# Patient Record
Sex: MALE | Race: BLACK OR AFRICAN AMERICAN | NOT HISPANIC OR LATINO | URBAN - METROPOLITAN AREA
[De-identification: names, ages, dates, MRNs, and addresses within clinical notes are randomized per-mention and may not be internally consistent; named-entity substitution may affect disease eponyms.]

---

## 2022-05-12 ENCOUNTER — INPATIENT (INPATIENT)
Facility: HOSPITAL | Age: 72
LOS: 2 days | Discharge: HOME | End: 2022-05-15
Attending: STUDENT IN AN ORGANIZED HEALTH CARE EDUCATION/TRAINING PROGRAM | Admitting: STUDENT IN AN ORGANIZED HEALTH CARE EDUCATION/TRAINING PROGRAM
Payer: MEDICAID

## 2022-05-12 VITALS
HEART RATE: 111 BPM | RESPIRATION RATE: 20 BRPM | TEMPERATURE: 99 F | OXYGEN SATURATION: 97 % | DIASTOLIC BLOOD PRESSURE: 101 MMHG | SYSTOLIC BLOOD PRESSURE: 117 MMHG

## 2022-05-12 DIAGNOSIS — J45.901 UNSPECIFIED ASTHMA WITH (ACUTE) EXACERBATION: ICD-10-CM

## 2022-05-12 LAB
ALBUMIN SERPL ELPH-MCNC: 4.3 G/DL — SIGNIFICANT CHANGE UP (ref 3.3–5)
ALP SERPL-CCNC: 106 U/L — SIGNIFICANT CHANGE UP (ref 40–120)
ALT FLD-CCNC: 19 U/L — SIGNIFICANT CHANGE UP (ref 4–41)
ANION GAP SERPL CALC-SCNC: 13 MMOL/L — SIGNIFICANT CHANGE UP (ref 7–14)
AST SERPL-CCNC: 29 U/L — SIGNIFICANT CHANGE UP (ref 4–40)
B PERT DNA SPEC QL NAA+PROBE: SIGNIFICANT CHANGE UP
B PERT+PARAPERT DNA PNL SPEC NAA+PROBE: SIGNIFICANT CHANGE UP
BASE EXCESS BLDV CALC-SCNC: 4.5 MMOL/L — HIGH (ref -2–3)
BASOPHILS # BLD AUTO: 0.05 K/UL — SIGNIFICANT CHANGE UP (ref 0–0.2)
BASOPHILS NFR BLD AUTO: 0.4 % — SIGNIFICANT CHANGE UP (ref 0–2)
BILIRUB SERPL-MCNC: 0.6 MG/DL — SIGNIFICANT CHANGE UP (ref 0.2–1.2)
BLOOD GAS VENOUS COMPREHENSIVE RESULT: SIGNIFICANT CHANGE UP
BORDETELLA PARAPERTUSSIS (RAPRVP): SIGNIFICANT CHANGE UP
BUN SERPL-MCNC: 9 MG/DL — SIGNIFICANT CHANGE UP (ref 7–23)
C PNEUM DNA SPEC QL NAA+PROBE: SIGNIFICANT CHANGE UP
CALCIUM SERPL-MCNC: 9.4 MG/DL — SIGNIFICANT CHANGE UP (ref 8.4–10.5)
CHLORIDE BLDV-SCNC: 102 MMOL/L — SIGNIFICANT CHANGE UP (ref 96–108)
CHLORIDE SERPL-SCNC: 99 MMOL/L — SIGNIFICANT CHANGE UP (ref 98–107)
CO2 BLDV-SCNC: 33.8 MMOL/L — HIGH (ref 22–26)
CO2 SERPL-SCNC: 26 MMOL/L — SIGNIFICANT CHANGE UP (ref 22–31)
CREAT SERPL-MCNC: 1.02 MG/DL — SIGNIFICANT CHANGE UP (ref 0.5–1.3)
EGFR: 78 ML/MIN/1.73M2 — SIGNIFICANT CHANGE UP
EOSINOPHIL # BLD AUTO: 0.26 K/UL — SIGNIFICANT CHANGE UP (ref 0–0.5)
EOSINOPHIL NFR BLD AUTO: 2.3 % — SIGNIFICANT CHANGE UP (ref 0–6)
FLUAV SUBTYP SPEC NAA+PROBE: SIGNIFICANT CHANGE UP
FLUBV RNA SPEC QL NAA+PROBE: SIGNIFICANT CHANGE UP
GAS PNL BLDV: 135 MMOL/L — LOW (ref 136–145)
GLUCOSE BLDV-MCNC: 121 MG/DL — HIGH (ref 70–99)
GLUCOSE SERPL-MCNC: 119 MG/DL — HIGH (ref 70–99)
HADV DNA SPEC QL NAA+PROBE: SIGNIFICANT CHANGE UP
HCO3 BLDV-SCNC: 32 MMOL/L — HIGH (ref 22–29)
HCOV 229E RNA SPEC QL NAA+PROBE: SIGNIFICANT CHANGE UP
HCOV HKU1 RNA SPEC QL NAA+PROBE: SIGNIFICANT CHANGE UP
HCOV NL63 RNA SPEC QL NAA+PROBE: SIGNIFICANT CHANGE UP
HCOV OC43 RNA SPEC QL NAA+PROBE: SIGNIFICANT CHANGE UP
HCT VFR BLD CALC: 43.2 % — SIGNIFICANT CHANGE UP (ref 39–50)
HCT VFR BLDA CALC: 45 % — SIGNIFICANT CHANGE UP (ref 39–51)
HGB BLD CALC-MCNC: 15.1 G/DL — SIGNIFICANT CHANGE UP (ref 13–17)
HGB BLD-MCNC: 15.7 G/DL — SIGNIFICANT CHANGE UP (ref 13–17)
HMPV RNA SPEC QL NAA+PROBE: SIGNIFICANT CHANGE UP
HPIV1 RNA SPEC QL NAA+PROBE: SIGNIFICANT CHANGE UP
HPIV2 RNA SPEC QL NAA+PROBE: SIGNIFICANT CHANGE UP
HPIV3 RNA SPEC QL NAA+PROBE: SIGNIFICANT CHANGE UP
HPIV4 RNA SPEC QL NAA+PROBE: SIGNIFICANT CHANGE UP
IANC: 8.06 K/UL — HIGH (ref 1.8–7.4)
IMM GRANULOCYTES NFR BLD AUTO: 0.3 % — SIGNIFICANT CHANGE UP (ref 0–1.5)
LACTATE BLDV-MCNC: 2.1 MMOL/L — HIGH (ref 0.5–2)
LYMPHOCYTES # BLD AUTO: 1.96 K/UL — SIGNIFICANT CHANGE UP (ref 1–3.3)
LYMPHOCYTES # BLD AUTO: 17.3 % — SIGNIFICANT CHANGE UP (ref 13–44)
M PNEUMO DNA SPEC QL NAA+PROBE: SIGNIFICANT CHANGE UP
MCHC RBC-ENTMCNC: 29.6 PG — SIGNIFICANT CHANGE UP (ref 27–34)
MCHC RBC-ENTMCNC: 36.3 GM/DL — HIGH (ref 32–36)
MCV RBC AUTO: 81.4 FL — SIGNIFICANT CHANGE UP (ref 80–100)
MONOCYTES # BLD AUTO: 0.97 K/UL — HIGH (ref 0–0.9)
MONOCYTES NFR BLD AUTO: 8.6 % — SIGNIFICANT CHANGE UP (ref 2–14)
NEUTROPHILS # BLD AUTO: 8.06 K/UL — HIGH (ref 1.8–7.4)
NEUTROPHILS NFR BLD AUTO: 71.1 % — SIGNIFICANT CHANGE UP (ref 43–77)
NRBC # BLD: 0 /100 WBCS — SIGNIFICANT CHANGE UP
NRBC # FLD: 0 K/UL — SIGNIFICANT CHANGE UP
NT-PROBNP SERPL-SCNC: 47 PG/ML — SIGNIFICANT CHANGE UP
PCO2 BLDV: 58 MMHG — HIGH (ref 42–55)
PH BLDV: 7.35 — SIGNIFICANT CHANGE UP (ref 7.32–7.43)
PLATELET # BLD AUTO: 250 K/UL — SIGNIFICANT CHANGE UP (ref 150–400)
PO2 BLDV: 36 MMHG — SIGNIFICANT CHANGE UP
POTASSIUM BLDV-SCNC: 4.3 MMOL/L — SIGNIFICANT CHANGE UP (ref 3.5–5.1)
POTASSIUM SERPL-MCNC: 5 MMOL/L — SIGNIFICANT CHANGE UP (ref 3.5–5.3)
POTASSIUM SERPL-SCNC: 5 MMOL/L — SIGNIFICANT CHANGE UP (ref 3.5–5.3)
PROT SERPL-MCNC: 8.9 G/DL — HIGH (ref 6–8.3)
RAPID RVP RESULT: SIGNIFICANT CHANGE UP
RBC # BLD: 5.31 M/UL — SIGNIFICANT CHANGE UP (ref 4.2–5.8)
RBC # FLD: 14.4 % — SIGNIFICANT CHANGE UP (ref 10.3–14.5)
RSV RNA SPEC QL NAA+PROBE: SIGNIFICANT CHANGE UP
RV+EV RNA SPEC QL NAA+PROBE: SIGNIFICANT CHANGE UP
SAO2 % BLDV: 61.4 % — SIGNIFICANT CHANGE UP
SARS-COV-2 RNA SPEC QL NAA+PROBE: SIGNIFICANT CHANGE UP
SODIUM SERPL-SCNC: 138 MMOL/L — SIGNIFICANT CHANGE UP (ref 135–145)
TROPONIN T, HIGH SENSITIVITY RESULT: 6 NG/L — SIGNIFICANT CHANGE UP
WBC # BLD: 11.33 K/UL — HIGH (ref 3.8–10.5)
WBC # FLD AUTO: 11.33 K/UL — HIGH (ref 3.8–10.5)

## 2022-05-12 PROCEDURE — 99285 EMERGENCY DEPT VISIT HI MDM: CPT

## 2022-05-12 PROCEDURE — 71045 X-RAY EXAM CHEST 1 VIEW: CPT | Mod: 26

## 2022-05-12 RX ORDER — LEVALBUTEROL 1.25 MG/.5ML
0.63 SOLUTION, CONCENTRATE RESPIRATORY (INHALATION) EVERY 6 HOURS
Refills: 0 | Status: DISCONTINUED | OUTPATIENT
Start: 2022-05-12 | End: 2022-05-13

## 2022-05-12 RX ORDER — IPRATROPIUM/ALBUTEROL SULFATE 18-103MCG
3 AEROSOL WITH ADAPTER (GRAM) INHALATION EVERY 4 HOURS
Refills: 0 | Status: DISCONTINUED | OUTPATIENT
Start: 2022-05-12 | End: 2022-05-12

## 2022-05-12 RX ORDER — IPRATROPIUM/ALBUTEROL SULFATE 18-103MCG
3 AEROSOL WITH ADAPTER (GRAM) INHALATION ONCE
Refills: 0 | Status: COMPLETED | OUTPATIENT
Start: 2022-05-12 | End: 2022-05-12

## 2022-05-12 RX ADMIN — Medication 40 MILLIGRAM(S): at 18:20

## 2022-05-12 RX ADMIN — Medication 3 MILLILITER(S): at 18:40

## 2022-05-12 RX ADMIN — Medication 3 MILLILITER(S): at 18:28

## 2022-05-12 RX ADMIN — Medication 3 MILLILITER(S): at 20:53

## 2022-05-12 RX ADMIN — Medication 40 MILLIGRAM(S): at 20:53

## 2022-05-12 RX ADMIN — Medication 3 MILLILITER(S): at 18:41

## 2022-05-12 NOTE — ED ADULT NURSE NOTE - OBJECTIVE STATEMENT
Pt received to rm  10 , awake and alert, A&OX4, ambulatory. C/o SOB with audible wheezing since Monday. States it has gotten worse over the last few days, but worst today, prompting ED visit. Reporting chest pain when coughing only.   Denies  N/V, HA, dizziness, palpitations, fatigue. 20G IV placed to RAC     . Bed in lowest position, call bell within reach. Will continue to monitor.

## 2022-05-12 NOTE — ED PROVIDER NOTE - CLINICAL SUMMARY MEDICAL DECISION MAKING FREE TEXT BOX
Pt with hx asthma here with cough wheeze sob, no fevers. CP only when coughing. No peripheral edema, no calf tenderness. Likely asthma related, less likely cardiac with exam and hx, no suspicion PE at this time will tx symptomatically with nebs, check trop/bnp, xr, rvp. Bipap if needs, likely admit.

## 2022-05-12 NOTE — ED PROVIDER NOTE - NS ED ROS FT
Constitutional: (-) fever (-) vomiting  Eyes/ENT: (-) vision changes, (-) hearing changes  Cardiovascular: (+) chest pain, (+) wheezing  Respiratory: +) cough, (+) shortness of breath  Gastrointestinal: (-) vomiting, (-) diarrhea, (-) abdominal pain  : (-) dysuria   Musculoskeletal: (-) back pain  Integumentary: (-) rash, (-) edema  Neurological: (-)loc  Allergic/Immunologic: (-) pruritus  Endocrine: No history of thyroid disease

## 2022-05-12 NOTE — ED PROVIDER NOTE - PHYSICAL EXAMINATION
Vitals: I have reviewed the patients vital signs  General: uncomfortable appearing  HEENT: Atraumatic, normocephalic, airway patent  Eyes: EOMI, tracking appropriately  Neck: no tracheal deviation, no JVD  Chest/Lungs: no trauma, symmetric chest rise, increased WOB with belly breathing, diffuse wheezing  Heart: skin and extremities well perfused, mildly tachy rate and rhythm, no peripheral edema  Neuro: A+Ox3, ambulating without difficulty, CN grossly intact  MSK: strength at baseline in all extremities, no muscle wasting or atrophy, no calf tenderness  Skin: no cyanosis, no jaundice, no new emergent lesions

## 2022-05-12 NOTE — ED PROVIDER NOTE - ATTENDING CONTRIBUTION TO CARE
Brief HPI:  70 yo M hx asthma (no intubations), htn presents with wheeze, cough, and shortness of breath for one week.  States cough is non-productive, non-bloody.  Denies cp, fever, chills, leg swelling.  Non-smoker.  No history of dvt/pe.      Vitals:   Reviewed    Exam:    GEN:  Non-toxic appearing, non-distressed, speaking full sentences, non-diaphoretic, AAOx3  HEENT:  NCAT, neck supple, EOMI, PERRLA, sclera anicteric, no conjunctival pallor or injection, no stridor, normal voice, no tonsillar exudate, uvula midline  CV:  tachy, regular, s1/s2 audible, no murmurs, rubs or gallops, peripheral pulses 2+ and symmetric  PULM:  tachypneic, wheeze in bilateral lung fields   ABD:  non distended, non-tender, no rebound, no guarding, negative Boss's sign, bowel sounds normal, no cvat  MSK:  no gross deformity, non-tender extremities and joints, range of motion grossly normal appearing, no extremity edema, extremities warm and well perfused   NEURO:  AAOx3, CN II-XII intact, motor 5/5 in upper and lower extremities bilaterally, sensation grossly intact in extremities and trunk  SKIN:  warm, dry, no rash or vesicles     A/P:  70 yo M hx asthma (no intubations), htn presents with wheeze, cough, and shortness of breath for one week.   VSS, no hypoxemia.  Diffuse wheeze on exam.  Patient appears euvolemic.  Suspect asthma exacerbation.  Low c/f chf exacerbation or pe.  Will send labs, cxr, ekg, supportive care.  Dispo pending.

## 2022-05-12 NOTE — ED PROVIDER NOTE - PROGRESS NOTE DETAILS
Aly: sx improving slowly pt recently started nebs will re eval. Curt, PGY3 - received pt as sign-out, asthma exacerbation, had just received duonebs prior to sign-out. Labs/CXR unremarkable, was pending re-eval. Pt reevaluated, still with diffuse wheezing BL, does not appear in respiratory distress, tachycardic to 120s (pt just received 3 duonebs, does not appear to have been spaced out based on work list), no CP/palpitations. Pt states wheeze improved but requesting admission for further management. Endorsed to hospitalist, who is requesting solumedrol 40 q8hr, duoneb q4h (ordered)

## 2022-05-12 NOTE — ED PROVIDER NOTE - OBJECTIVE STATEMENT
72 y/o M hx asthma, htn, no known coronary disease here now with wheezing since monday, worsening. Similar to previous asthma. Has cough, non productive. No fever/chills. No leg swelling. Has CP when coughing only. Did not have nebs/inhalers at home to try. 70 y/o M hx asthma, htn, no known coronary disease here now with wheezing since monday, worsening. Similar to previous asthma. Has cough, non productive. No fever/chills. No leg swelling. Has CP when coughing only. Did not have nebs/inhalers at home to try. Never smoker

## 2022-05-12 NOTE — ED ADULT TRIAGE NOTE - CHIEF COMPLAINT QUOTE
wheezing, SOB and coughing x 1 week. PT denies any N/V/D or fever. PT reports pain with coughing. PMH HTN, enlarged Prostate

## 2022-05-13 DIAGNOSIS — I10 ESSENTIAL (PRIMARY) HYPERTENSION: ICD-10-CM

## 2022-05-13 DIAGNOSIS — Z29.9 ENCOUNTER FOR PROPHYLACTIC MEASURES, UNSPECIFIED: ICD-10-CM

## 2022-05-13 DIAGNOSIS — R00.0 TACHYCARDIA, UNSPECIFIED: ICD-10-CM

## 2022-05-13 DIAGNOSIS — N40.0 BENIGN PROSTATIC HYPERPLASIA WITHOUT LOWER URINARY TRACT SYMPTOMS: ICD-10-CM

## 2022-05-13 DIAGNOSIS — Z98.890 OTHER SPECIFIED POSTPROCEDURAL STATES: Chronic | ICD-10-CM

## 2022-05-13 DIAGNOSIS — J45.901 UNSPECIFIED ASTHMA WITH (ACUTE) EXACERBATION: ICD-10-CM

## 2022-05-13 LAB
24R-OH-CALCIDIOL SERPL-MCNC: 25.6 NG/ML — LOW (ref 30–80)
A1C WITH ESTIMATED AVERAGE GLUCOSE RESULT: 5.5 % — SIGNIFICANT CHANGE UP (ref 4–5.6)
ANION GAP SERPL CALC-SCNC: 13 MMOL/L — SIGNIFICANT CHANGE UP (ref 7–14)
BUN SERPL-MCNC: 15 MG/DL — SIGNIFICANT CHANGE UP (ref 7–23)
CALCIUM SERPL-MCNC: 9.3 MG/DL — SIGNIFICANT CHANGE UP (ref 8.4–10.5)
CHLORIDE SERPL-SCNC: 100 MMOL/L — SIGNIFICANT CHANGE UP (ref 98–107)
CHOLEST SERPL-MCNC: 179 MG/DL — SIGNIFICANT CHANGE UP
CK MB BLD-MCNC: 3.5 % — HIGH (ref 0–2.5)
CK MB CFR SERPL CALC: 5.5 NG/ML — SIGNIFICANT CHANGE UP
CK SERPL-CCNC: 155 U/L — SIGNIFICANT CHANGE UP (ref 30–200)
CO2 SERPL-SCNC: 23 MMOL/L — SIGNIFICANT CHANGE UP (ref 22–31)
CREAT SERPL-MCNC: 1.14 MG/DL — SIGNIFICANT CHANGE UP (ref 0.5–1.3)
D DIMER BLD IA.RAPID-MCNC: 198 NG/ML DDU — SIGNIFICANT CHANGE UP
EGFR: 68 ML/MIN/1.73M2 — SIGNIFICANT CHANGE UP
ESTIMATED AVERAGE GLUCOSE: 111 — SIGNIFICANT CHANGE UP
GLUCOSE BLDC GLUCOMTR-MCNC: 143 MG/DL — HIGH (ref 70–99)
GLUCOSE BLDC GLUCOMTR-MCNC: 144 MG/DL — HIGH (ref 70–99)
GLUCOSE BLDC GLUCOMTR-MCNC: 149 MG/DL — HIGH (ref 70–99)
GLUCOSE BLDC GLUCOMTR-MCNC: 224 MG/DL — HIGH (ref 70–99)
GLUCOSE SERPL-MCNC: 189 MG/DL — HIGH (ref 70–99)
HCT VFR BLD CALC: 40.6 % — SIGNIFICANT CHANGE UP (ref 39–50)
HDLC SERPL-MCNC: 52 MG/DL — SIGNIFICANT CHANGE UP
HGB BLD-MCNC: 14.7 G/DL — SIGNIFICANT CHANGE UP (ref 13–17)
LACTATE SERPL-SCNC: 3 MMOL/L — HIGH (ref 0.5–2)
LIPID PNL WITH DIRECT LDL SERPL: 117 MG/DL — HIGH
MAGNESIUM SERPL-MCNC: 2 MG/DL — SIGNIFICANT CHANGE UP (ref 1.6–2.6)
MCHC RBC-ENTMCNC: 29.8 PG — SIGNIFICANT CHANGE UP (ref 27–34)
MCHC RBC-ENTMCNC: 36.2 GM/DL — HIGH (ref 32–36)
MCV RBC AUTO: 82.4 FL — SIGNIFICANT CHANGE UP (ref 80–100)
NON HDL CHOLESTEROL: 127 MG/DL — SIGNIFICANT CHANGE UP
NRBC # BLD: 0 /100 WBCS — SIGNIFICANT CHANGE UP
NRBC # FLD: 0 K/UL — SIGNIFICANT CHANGE UP
PHOSPHATE SERPL-MCNC: 1.6 MG/DL — LOW (ref 2.5–4.5)
PLATELET # BLD AUTO: 235 K/UL — SIGNIFICANT CHANGE UP (ref 150–400)
POTASSIUM SERPL-MCNC: 4 MMOL/L — SIGNIFICANT CHANGE UP (ref 3.5–5.3)
POTASSIUM SERPL-SCNC: 4 MMOL/L — SIGNIFICANT CHANGE UP (ref 3.5–5.3)
RBC # BLD: 4.93 M/UL — SIGNIFICANT CHANGE UP (ref 4.2–5.8)
RBC # FLD: 14 % — SIGNIFICANT CHANGE UP (ref 10.3–14.5)
SODIUM SERPL-SCNC: 136 MMOL/L — SIGNIFICANT CHANGE UP (ref 135–145)
TRIGL SERPL-MCNC: 49 MG/DL — SIGNIFICANT CHANGE UP
TROPONIN T, HIGH SENSITIVITY RESULT: 9 NG/L — SIGNIFICANT CHANGE UP
TSH SERPL-MCNC: 0.32 UIU/ML — SIGNIFICANT CHANGE UP (ref 0.27–4.2)
WBC # BLD: 8.78 K/UL — SIGNIFICANT CHANGE UP (ref 3.8–10.5)
WBC # FLD AUTO: 8.78 K/UL — SIGNIFICANT CHANGE UP (ref 3.8–10.5)

## 2022-05-13 PROCEDURE — 99223 1ST HOSP IP/OBS HIGH 75: CPT

## 2022-05-13 PROCEDURE — 12345: CPT | Mod: NC,GC

## 2022-05-13 RX ORDER — ALBUTEROL 90 UG/1
2 AEROSOL, METERED ORAL EVERY 6 HOURS
Refills: 0 | Status: DISCONTINUED | OUTPATIENT
Start: 2022-05-13 | End: 2022-05-15

## 2022-05-13 RX ORDER — GLUCAGON INJECTION, SOLUTION 0.5 MG/.1ML
1 INJECTION, SOLUTION SUBCUTANEOUS ONCE
Refills: 0 | Status: DISCONTINUED | OUTPATIENT
Start: 2022-05-13 | End: 2022-05-13

## 2022-05-13 RX ORDER — DEXTROSE 50 % IN WATER 50 %
15 SYRINGE (ML) INTRAVENOUS ONCE
Refills: 0 | Status: DISCONTINUED | OUTPATIENT
Start: 2022-05-13 | End: 2022-05-13

## 2022-05-13 RX ORDER — ACETAMINOPHEN 500 MG
650 TABLET ORAL EVERY 6 HOURS
Refills: 0 | Status: DISCONTINUED | OUTPATIENT
Start: 2022-05-13 | End: 2022-05-15

## 2022-05-13 RX ORDER — SODIUM CHLORIDE 9 MG/ML
1000 INJECTION, SOLUTION INTRAVENOUS
Refills: 0 | Status: DISCONTINUED | OUTPATIENT
Start: 2022-05-13 | End: 2022-05-13

## 2022-05-13 RX ORDER — LANOLIN ALCOHOL/MO/W.PET/CERES
3 CREAM (GRAM) TOPICAL AT BEDTIME
Refills: 0 | Status: DISCONTINUED | OUTPATIENT
Start: 2022-05-13 | End: 2022-05-15

## 2022-05-13 RX ORDER — INSULIN LISPRO 100/ML
VIAL (ML) SUBCUTANEOUS
Refills: 0 | Status: DISCONTINUED | OUTPATIENT
Start: 2022-05-13 | End: 2022-05-13

## 2022-05-13 RX ORDER — DEXTROSE 50 % IN WATER 50 %
25 SYRINGE (ML) INTRAVENOUS ONCE
Refills: 0 | Status: DISCONTINUED | OUTPATIENT
Start: 2022-05-13 | End: 2022-05-13

## 2022-05-13 RX ORDER — HEPARIN SODIUM 5000 [USP'U]/ML
5000 INJECTION INTRAVENOUS; SUBCUTANEOUS EVERY 8 HOURS
Refills: 0 | Status: DISCONTINUED | OUTPATIENT
Start: 2022-05-13 | End: 2022-05-13

## 2022-05-13 RX ORDER — INSULIN LISPRO 100/ML
VIAL (ML) SUBCUTANEOUS AT BEDTIME
Refills: 0 | Status: DISCONTINUED | OUTPATIENT
Start: 2022-05-13 | End: 2022-05-13

## 2022-05-13 RX ORDER — TAMSULOSIN HYDROCHLORIDE 0.4 MG/1
0.4 CAPSULE ORAL AT BEDTIME
Refills: 0 | Status: DISCONTINUED | OUTPATIENT
Start: 2022-05-13 | End: 2022-05-15

## 2022-05-13 RX ORDER — SODIUM,POTASSIUM PHOSPHATES 278-250MG
1 POWDER IN PACKET (EA) ORAL
Refills: 0 | Status: COMPLETED | OUTPATIENT
Start: 2022-05-13 | End: 2022-05-13

## 2022-05-13 RX ORDER — ENOXAPARIN SODIUM 100 MG/ML
40 INJECTION SUBCUTANEOUS EVERY 24 HOURS
Refills: 0 | Status: DISCONTINUED | OUTPATIENT
Start: 2022-05-13 | End: 2022-05-15

## 2022-05-13 RX ORDER — LEVALBUTEROL 1.25 MG/.5ML
0.63 SOLUTION, CONCENTRATE RESPIRATORY (INHALATION) EVERY 6 HOURS
Refills: 0 | Status: DISCONTINUED | OUTPATIENT
Start: 2022-05-13 | End: 2022-05-15

## 2022-05-13 RX ORDER — DEXTROSE 50 % IN WATER 50 %
12.5 SYRINGE (ML) INTRAVENOUS ONCE
Refills: 0 | Status: DISCONTINUED | OUTPATIENT
Start: 2022-05-13 | End: 2022-05-13

## 2022-05-13 RX ORDER — ONDANSETRON 8 MG/1
4 TABLET, FILM COATED ORAL EVERY 8 HOURS
Refills: 0 | Status: DISCONTINUED | OUTPATIENT
Start: 2022-05-13 | End: 2022-05-15

## 2022-05-13 RX ORDER — LABETALOL HCL 100 MG
100 TABLET ORAL
Refills: 0 | Status: DISCONTINUED | OUTPATIENT
Start: 2022-05-13 | End: 2022-05-15

## 2022-05-13 RX ORDER — BUDESONIDE AND FORMOTEROL FUMARATE DIHYDRATE 160; 4.5 UG/1; UG/1
2 AEROSOL RESPIRATORY (INHALATION)
Refills: 0 | Status: DISCONTINUED | OUTPATIENT
Start: 2022-05-13 | End: 2022-05-15

## 2022-05-13 RX ADMIN — Medication 40 MILLIGRAM(S): at 06:02

## 2022-05-13 RX ADMIN — BUDESONIDE AND FORMOTEROL FUMARATE DIHYDRATE 2 PUFF(S): 160; 4.5 AEROSOL RESPIRATORY (INHALATION) at 21:59

## 2022-05-13 RX ADMIN — ENOXAPARIN SODIUM 40 MILLIGRAM(S): 100 INJECTION SUBCUTANEOUS at 21:59

## 2022-05-13 RX ADMIN — LEVALBUTEROL 0.63 MILLIGRAM(S): 1.25 SOLUTION, CONCENTRATE RESPIRATORY (INHALATION) at 16:37

## 2022-05-13 RX ADMIN — Medication 100 MILLIGRAM(S): at 17:40

## 2022-05-13 RX ADMIN — Medication 100 MILLIGRAM(S): at 06:02

## 2022-05-13 RX ADMIN — Medication 1 PACKET(S): at 17:41

## 2022-05-13 RX ADMIN — Medication 40 MILLIGRAM(S): at 12:04

## 2022-05-13 RX ADMIN — LEVALBUTEROL 0.63 MILLIGRAM(S): 1.25 SOLUTION, CONCENTRATE RESPIRATORY (INHALATION) at 12:17

## 2022-05-13 RX ADMIN — LEVALBUTEROL 0.63 MILLIGRAM(S): 1.25 SOLUTION, CONCENTRATE RESPIRATORY (INHALATION) at 23:33

## 2022-05-13 RX ADMIN — Medication 1 PACKET(S): at 08:57

## 2022-05-13 RX ADMIN — HEPARIN SODIUM 5000 UNIT(S): 5000 INJECTION INTRAVENOUS; SUBCUTANEOUS at 12:03

## 2022-05-13 RX ADMIN — Medication 200 MILLIGRAM(S): at 17:38

## 2022-05-13 RX ADMIN — Medication 1 PACKET(S): at 12:04

## 2022-05-13 RX ADMIN — Medication 100 MILLIGRAM(S): at 17:41

## 2022-05-13 RX ADMIN — TAMSULOSIN HYDROCHLORIDE 0.4 MILLIGRAM(S): 0.4 CAPSULE ORAL at 21:59

## 2022-05-13 NOTE — H&P ADULT - ASSESSMENT
71M history of asthma, with one prior hospitalization many years ago, not on any medications, admitted for asthma exacerbation.

## 2022-05-13 NOTE — H&P ADULT - PROBLEM SELECTOR PLAN 4
- Not on medications, BPs here noted to be elevated  - Given his sinus tach and elevated BPs will place patient on BB with labetalol 100mg BID, monitor response to medication  - Will likely need PCP appointment on discharge for follow up of his BP and other medical issues  - Check A1C, TSH, and lipid profile in AM

## 2022-05-13 NOTE — PROGRESS NOTE ADULT - PROBLEM SELECTOR PLAN 5
DVT ppx - Low risk, will place on SCDs  Diet - DASH/CC diet  Activity - Ambulate with assistance  Fall, Aspiration, safety and seizure precautions.  Errol calvin -DVT ppx - HSQ  -Diet - DASH/CC diet  -Activity - Ambulate with assistance  -Fall, Aspiration, safety and seizure precautions.  -JOE calvin

## 2022-05-13 NOTE — H&P ADULT - NSHPSOCIALHISTORY_GEN_ALL_CORE
.  Lives with spouse.  Denies Nicotine.  ETOH < 1 x a month.  Illicit/ recreational drugs: Denies.  Colonoscopy 2018: Normal.   Flu Vax: Denies  PNA Vax: Denies  COVID Vax X 2  Retired hike.

## 2022-05-13 NOTE — MEDICAL STUDENT PROGRESS NOTE(EDUCATION) - NS MD HP STUD ASPLAN PLAN FT
Problem/Plan - 1: Acute asthma exacerbation in the setting of allergen exposure and poor medical compliance.    [] Continue Nebulizer Q4  [] Transition to Prednisone 40 mg PO   [] ERROL eval for medication accessibility.  [] Lactate mildly elevated to 3.0, will repeat in AM.    Problem/Plan - 2: Sinus tachycardia most likely secondary to albuterol vs. stress response. Low concern for PE  [] D Dimer = 198.  [] EKG sinus tach   [] A1C, Lipid panel wnl  [] Monitor HR with treatment of his asthma, if not improving then consider tele    Problem/Plan - 3: BPH denies any issues urinating currently, including dysuria increased frequency   [] consider restarting tamsulosin if patient retaining.     Problem/Plan - 4: Essential hypertension in the setting of poor medical compliance.  [] BP currently well controlled    [] labetalol 100mg BID    Problem/Plan - 5: Preventive measure.   [] Lovenox   [] Diet - DASH/CC diet  Activity - Ambulate with assistance  Fall, Aspiration, safety and seizure precautions.  Errol eval.

## 2022-05-13 NOTE — PROGRESS NOTE ADULT - ASSESSMENT
71M history of asthma, with one prior hospitalization many years ago, not on any medications, admitted for asthma exacerbation.  71M history of asthma, with one prior hospitalization many years ago, not on any medications, admitted for asthma exacerbation, with symptomatic improvement after neb treatment, s/p IV solumedrol transitioned to PO prednisone 50mg. Pending PT consult for dispo planning.

## 2022-05-13 NOTE — H&P ADULT - NEUROLOGICAL DETAILS
responds to verbal commands/sensation intact alert and oriented x 3/responds to verbal commands/sensation intact/normal strength

## 2022-05-13 NOTE — H&P ADULT - NS ATTEND AMEND GEN_ALL_CORE FT
Patient seen and examined on 5/13/22, case discussed with MERT Robison. This is a 71M with history as above who presents to the hospital with asthma exacerbation. Symptoms since Monday of this week (5 days PTA), worsening over time, no associated cough/sputum, no URI, no fevers/chills, no chest pain/palpitations/LOC, no other acute complaints. Here noted to have wheezing and persistent tachycardia, s/p solumedrol and duonebs in ED with improvement in his wheezing. Here on examination still with some scattered wheezing and persistent tachycardia, otherwise benign. Lab work, imaging, and EKG reviewed. Currently with asthma exacerbation, will c/w steroids, duonebs. Still with persistent tachycardia but denies any symptoms, EKG with sinus tach, ddimer and trops low and negative, unclear if he has tachycardia at baseline or if this is 2/2 asthma exacerbation, will continue to monitor. BPs elevated, will start on labetalol. Other management as above.

## 2022-05-13 NOTE — H&P ADULT - NSHPPHYSICALEXAM_GEN_ALL_CORE
Vital Signs Last 24 Hrs  T(C): 36.9 (12 May 2022 20:45), Max: 37.3 (12 May 2022 15:26)  T(F): 98.4 (12 May 2022 20:45), Max: 99.1 (12 May 2022 15:26)  HR: 114 (12 May 2022 20:45) (101 - 124)  BP: 156/88 (12 May 2022 20:45) (117/101 - 199/109)  BP(mean): --  RR: 17 (12 May 2022 20:45) (17 - 30)  SpO2: 100% (12 May 2022 20:45) (95% - 100%)

## 2022-05-13 NOTE — H&P ADULT - NSICDXPASTMEDICALHX_GEN_ALL_CORE_FT
PAST MEDICAL HISTORY:  Asthma      PAST MEDICAL HISTORY:  Asthma     BPH (benign prostatic hyperplasia)     Essential hypertension

## 2022-05-13 NOTE — H&P ADULT - RS GEN PE MLT RESP DETAILS PC
airway patent/breath sounds equal/respirations non-labored/no chest wall tenderness/no intercostal retractions/wheezes

## 2022-05-13 NOTE — H&P ADULT - NSVTERISKASSESS_GEN_ALL_CORE FT
VTE Present on Admission, Assessment Completed on: 13-May-2022 02:01 Medical Assessment Completed on: 13-May-2022 05:22

## 2022-05-13 NOTE — H&P ADULT - NEGATIVE MUSCULOSKELETAL SYMPTOMS
no muscle weakness/no arm pain L/no arm pain R/no leg pain L/no leg pain R no arthralgia/no arthritis/no myalgia/no muscle weakness/no arm pain L/no arm pain R/no leg pain L/no leg pain R

## 2022-05-13 NOTE — H&P ADULT - PROBLEM SELECTOR PLAN 3
VTE with Lovenox 40 mg sub cut daily.  Fall, Aspiration, safety and seizure precautions.  Errol calvin - Not on tamsulosin, denies any issues urinating currently  - Will place on bladder scans q8h for PVR check, consider restarting if patient with retention

## 2022-05-13 NOTE — H&P ADULT - MUSCULOSKELETAL
normal strength/no joint swelling/no joint erythema/no joint warmth/no calf tenderness detailed exam details…

## 2022-05-13 NOTE — H&P ADULT - PROBLEM SELECTOR PLAN 2
- D Dimer = 198.  Low suspicion for P.E.   - Albuterol nebs Q4* changed to Xopenex Neb Q4* to decrease S.E. of tachycardia.  - F/U TSH, A1C, Lipid panel. - D Dimer = 198.  - Low suspicion for P.E.   - Albuterol nebs Q4* changed to Xopenex Neb Q4* to decrease S.E. of tachycardia.  - F/U TSH, A1C, Lipid panel.  - Monitor HR with treatment of his asthma, if not improving then consider further evaluation (?TTE)

## 2022-05-13 NOTE — DISCHARGE NOTE PROVIDER - NSDCMRMEDTOKEN_GEN_ALL_CORE_FT
acetaminophen 325 mg oral tablet: 2 tab(s) orally every 6 hours, As needed, Temp greater or equal to 38C (100.4F), Mild Pain (1 - 3)  albuterol 90 mcg/inh inhalation aerosol: 2 puff(s) inhaled every 6 hours, As needed, Shortness of Breath and/or Wheezing  budesonide-formoterol 80 mcg-4.5 mcg/inh inhalation aerosol: 2 puff(s) inhaled 2 times a day   labetalol 200 mg oral tablet: 1 tab(s) orally every 12 hours   predniSONE 20 mg oral tablet: 2 tab(s) orally once a day   tamsulosin 0.4 mg oral capsule: 1 cap(s) orally once a day (at bedtime)

## 2022-05-13 NOTE — MEDICAL STUDENT PROGRESS NOTE(EDUCATION) - SUBJECTIVE AND OBJECTIVE BOX
Patient is a 71y old  Male who presents with a chief complaint of Wheezing x 5 days (13 May 2022 07:29)      INTERVAL HPI/OVERNIGHT EVENTS:    No acute events overnight. Patient endorses improvement in breathing. No chest pain, fever, cough, or any other acute complaints at this time.     T(C): 37.4 (05-13-22 @ 06:00), Max: 37.4 (05-13-22 @ 06:00)  HR: 113 (05-13-22 @ 06:00) (101 - 124)  BP: 132/70 (05-13-22 @ 06:00) (117/101 - 199/109)  RR: 16 (05-13-22 @ 06:00) (16 - 30)  SpO2: 96% (05-13-22 @ 06:00) (95% - 100%)  Wt(kg): --  I&O's Summary    12 May 2022 07:01  -  13 May 2022 07:00  --------------------------------------------------------  IN: 0 mL / OUT: 100 mL / NET: -100 mL        LABS:                        14.7   8.78  )-----------( 235      ( 13 May 2022 06:15 )             40.6     05-13    136  |  100  |  15  ----------------------------<  189<H>  4.0   |  23  |  1.14    Ca    9.3      13 May 2022 06:15  Phos  1.6     05-13  Mg     2.00     05-13    TPro  8.9<H>  /  Alb  4.3  /  TBili  0.6  /  DBili  x   /  AST  29  /  ALT  19  /  AlkPhos  106  05-12        CAPILLARY BLOOD GLUCOSE      POCT Blood Glucose.: 143 mg/dL (13 May 2022 08:49)  POCT Blood Glucose.: 224 mg/dL (13 May 2022 02:26)            MEDICATIONS  (STANDING):  heparin   Injectable 5000 Unit(s) SubCutaneous every 8 hours  labetalol 100 milliGRAM(s) Oral two times a day  levalbuterol Inhalation 0.63 milliGRAM(s) Inhalation every 6 hours  potassium phosphate / sodium phosphate Powder (PHOS-NaK) 1 Packet(s) Oral three times a day with meals  predniSONE   Tablet 40 milliGRAM(s) Oral daily    MEDICATIONS  (PRN):  acetaminophen     Tablet .. 650 milliGRAM(s) Oral every 6 hours PRN Temp greater or equal to 38C (100.4F), Mild Pain (1 - 3)  benzonatate 100 milliGRAM(s) Oral every 8 hours PRN Cough  guaiFENesin Oral Liquid (Sugar-Free) 200 milliGRAM(s) Oral every 6 hours PRN Cough  melatonin 3 milliGRAM(s) Oral at bedtime PRN Insomnia  ondansetron Injectable 4 milliGRAM(s) IV Push every 8 hours PRN Nausea and/or Vomiting      REVIEW OF SYSTEMS:  CONSTITUTIONAL: No fever, weight loss, or fatigue  EYES: No eye pain, visual disturbances, or discharge  ENMT:  No difficulty hearing, tinnitus, vertigo; No sinus or throat pain  NECK: No pain or stiffness  RESPIRATORY: No cough, wheezing, chills or hemoptysis; (+) shortness of breath  CARDIOVASCULAR: No chest pain, palpitations, dizziness, or leg swelling  GASTROINTESTINAL: No abdominal or epigastric pain. No nausea, vomiting, or hematemesis; No diarrhea or constipation. No melena or hematochezia.  GENITOURINARY: No dysuria, frequency, hematuria, or incontinence  NEUROLOGICAL: No headaches, memory loss, loss of strength, numbness, or tremors  SKIN: No itching, burning, rashes, or lesions   LYMPH NODES: No enlarged glands  ENDOCRINE: No heat or cold intolerance; No hair loss  MUSCULOSKELETAL: No joint pain or swelling; No muscle, back, or extremity pain  PSYCHIATRIC: No depression, anxiety, mood swings, or difficulty sleeping  HEME/LYMPH: No easy bruising, or bleeding gums  ALLERY AND IMMUNOLOGIC: No hives or eczema    RADIOLOGY & ADDITIONAL TESTS:    Imaging Personally Reviewed:  [x] YES  [ ] NO    Consultant(s) Notes Reviewed:  [x] YES  [ ] NO    PHYSICAL EXAM:  GENERAL: NAD, well-groomed, well-developed, speaking in full sentences   HEAD:  Atraumatic, Normocephalic  EYES: EOMI, conjunctiva and sclera clear  ENMT: Moist mucous membranes, Good dentition, No lesions  NECK: Supple, No JVD, Normal thyroid  NERVOUS SYSTEM:  Alert & Oriented X3, Good concentration; Motor Strength 5/5 B/L upper and lower extremities  CHEST/LUNG: faint R-sided expiratory wheeze with slightly diminished breath sounds at the base; No rales, rhonchi, or rubs  HEART: Regular rate and rhythm; No murmurs, rubs, or gallops  ABDOMEN: Soft, Nontender, Nondistended; Bowel sounds present  EXTREMITIES: No clubbing, cyanosis, or edema  LYMPH: No lymphadenopathy noted  SKIN: No rashes or lesions      < from: Xray Chest 1 View-PORTABLE IMMEDIATE (05.12.22 @ 18:34) >    ACC: [id number] EXAM:  XR CHEST PORTABLE IMMED 1V                          PROCEDURE DATE:  05/12/2022          INTERPRETATION:  INDICATION: Chest pain    COMPARISON: None    FINDINGS:  The heart is normal in size.  The lungs are clear.  No pleural effusion or pneumothorax.    Impression:  Clear lungs    --- End of Report ---          DAYANARA JUARES MD; Resident Radiology  This document has been electronically signed.  NAVEEN MONTEMAYOR MD; Attending Radiologist  This document has been electronically signed. May 13 2022  7:56AM    < end of copied text >

## 2022-05-13 NOTE — DISCHARGE NOTE PROVIDER - CARE PROVIDER_API CALL
NATACHA RUSS  Internal Medicine  [address]  [facility] [place] [address]  Phone: [phone]  Fax: [phone]  Follow Up Time:

## 2022-05-13 NOTE — DISCHARGE NOTE PROVIDER - HOSPITAL COURSE
71M history of asthma, with one prior hospitalization many years ago, not on any medications, admitted for asthma exacerbation. Pt s/p IV solumedrol and nebs with improvement. Showed improvement and transitioned to PO prednisone. Switched to Xopenex given tachycardia. Tachycardia improving, sinus on EKG. Course c/b HTN urgency, improved on labetalol. Started on low ICS+ formoterol inhaler with a RENEE prn given pt will be unable to f/u outpt for at least a month due to insurance issues. 71M history of asthma, with one prior hospitalization many years ago, not on any medications, admitted for asthma exacerbation. Pt s/p IV solumedrol and nebs with improvement. Showed improvement and transitioned to PO prednisone. Switched to Xopenex given tachycardia. Tachycardia improving, sinus on EKG. Course c/b HTN urgency, improved on labetalol. Started on low ICS+ formoterol inhaler with a RENEE prn given pt will be unable to f/u outpt for at least a month due to insurance issues.     At this time, patient is optimized for discharge with outpatient follow-up.

## 2022-05-13 NOTE — H&P ADULT - PROBLEM SELECTOR PLAN 1
- Continue Nebulizer Q4* and Solumedrol 40 mg IVC Q8*.  - Transition to Prednisone 40 mg po when able.  - Pt started on FS QID and low dose corrective ISS while on steroid therapy.   - Check peak flow daily.  - Robitussin and Tessalon for chest congestion.   - SW eval for medication accessibility. - Continue Nebulizer Q4* and Solumedrol 40 mg IVC Q8*.  - Transition to Prednisone 40 mg po when able.  - Pt started on FS QID and low dose corrective ISS while on steroid therapy.   - Check peak flow daily.  - Robitussin and Tessalon for chest congestion.   - SW eval for medication accessibility.  - Lactate mildly elevated to 2.1, will repeat in AM

## 2022-05-13 NOTE — PROGRESS NOTE ADULT - PROBLEM SELECTOR PLAN 4
- Not on medications, BPs here noted to be elevated  - Given his sinus tach and elevated BPs will place patient on BB with labetalol 100mg BID, monitor response to medication  - Will likely need PCP appointment on discharge for follow up of his BP and other medical issues  - Check A1C, TSH, and lipid profile in AM - Not on medications, BPs here noted to be elevated  - Given his sinus tach and elevated BPs will place patient on BB with labetalol 100mg BID, monitor response to medication  - Will likely need PCP appointment on discharge for follow up of his BP and other medical issues  - A1C: 5.5  - Lipid panel sent

## 2022-05-13 NOTE — PROGRESS NOTE ADULT - PROBLEM SELECTOR PLAN 2
- D Dimer = 198.  - Low suspicion for P.E.   - Albuterol nebs Q4* changed to Xopenex Neb Q4* to decrease S.E. of tachycardia.  - F/U TSH, A1C, Lipid panel.  - Monitor HR with treatment of his asthma, if not improving then consider further evaluation (?TTE) - D Dimer = 198.  - Low suspicion for P.E.   - Albuterol nebs Q4* changed to Xopenex Neb Q4* to decrease S.E. of tachycardia.  - TSH wnl  - Monitor HR with treatment of his asthma, if not improving then consider further evaluation (?Tele or TTE)

## 2022-05-13 NOTE — PATIENT PROFILE ADULT - SW.
Patient Education        Recurring Migraine Headache: Care Instructions  Your Care Instructions  Migraines are painful, throbbing headaches. They often start on one side of the head. They may cause nausea and vomiting and make you sensitive to light, sound, or smell. Some people may have only a few migraines throughout life. Others have them as often as several times a month. The goal of treatment is to reduce the number of migraines you have and relieve your symptoms. Even with treatment, you may continue to have migraines. You play an important role in dealing with your headaches. Work on avoiding things that seem to trigger your migraines. When you feel a headache coming on, act quickly to stop it before it gets worse. Follow-up care is a key part of your treatment and safety. Be sure to make and go to all appointments, and call your doctor if you are having problems. It's also a good idea to know your test results and keep a list of the medicines you take. How can you care for yourself at home? · Do not drive if you have taken a prescription pain medicine. · Rest in a quiet, dark room until your headache is gone. Close your eyes and try to relax or go to sleep. Do not watch TV or read. · Put a cold, moist cloth or cold pack on the painful area for 10 to 20 minutes at a time. Put a thin cloth between the cold pack and your skin. · Have someone gently massage your neck and shoulders. · Take your medicines exactly as prescribed. Call your doctor if you think you are having a problem with your medicine. You will get more details on the specific medicines your doctor prescribes. · Don't take medicine for headache pain too often. Talk to your doctor if you are taking medicine more than 2 days a week to stop a headache. Taking too much pain medicine can lead to more headaches. These are called medicine-overuse headaches.   To prevent migraines  · Keep a headache diary so you can figure out what triggers your headaches. Avoiding triggers may help you prevent headaches. Record when each headache began, how long it lasted, and what the pain was like. Write down any other symptoms you had with the headache. These may include nausea, flashing lights or dark spots, or sensitivity to bright light or loud noise. Note if the headache occurred near your period. List anything that might have triggered the headache. Triggers may include certain foods (chocolate, cheese, wine) or odors, smoke, bright light, stress, or lack of sleep. · If your doctor has prescribed medicine for your migraines, take it as directed. You may have medicine that you take only when you get a migraine and medicine that you take all the time to help prevent migraines. ? If your doctor has prescribed medicine for when you get a headache, take it at the first sign of a migraine, unless your doctor has given you other instructions. ? If your doctor has prescribed medicine to prevent migraines, take it exactly as prescribed. Call your doctor if you think you are having a problem with your medicine. · Find healthy ways to deal with stress. Migraines are most common during or right after stressful times. Try finding ways to reduce stress like practicing mindfulness or deep breathing exercises. · Get regular sleep and exercise. But be careful to not push yourself too hard during exercise. It may trigger a headache. · Eat regular meals, and avoid foods and drinks that often trigger migraines. These include chocolate and alcohol, especially red wine and port. Chemicals used in food, such as aspartame and monosodium glutamate (MSG), also can trigger migraines. So can some food additives, such as those found in hot dogs, barragan, cold cuts, aged cheeses, and pickled foods. · Limit caffeine by not drinking too much coffee, tea, or soda. Do not quit caffeine suddenly, because that can also give you migraines. · Do not smoke or allow others to smoke around you.  If you need help quitting, talk to your doctor about stop-smoking programs and medicines. These can increase your chances of quitting for good. · If you are taking birth control pills or hormone therapy, talk to your doctor about whether they are triggering your migraines. When should you call for help? Call 911 anytime you think you may need emergency care. For example, call if:    · You have symptoms of a stroke. These may include:  ? Sudden numbness, tingling, weakness, or loss of movement in your face, arm, or leg, especially on only one side of your body. ? Sudden vision changes. ? Sudden trouble speaking. ? Sudden confusion or trouble understanding simple statements. ? Sudden problems with walking or balance. ? A sudden, severe headache that is different from past headaches. Call your doctor now or seek immediate medical care if:    · You develop a fever and a stiff neck.     · You have new nausea and vomiting, or you cannot keep down food or liquids. Watch closely for changes in your health, and be sure to contact your doctor if:    · You have a headache that does not get better within 1 or 2 days.     · Your headaches get worse or happen more often. Where can you learn more? Go to http://www.gray.com/  Enter V975 in the search box to learn more about \"Recurring Migraine Headache: Care Instructions. \"  Current as of: November 20, 2019               Content Version: 12.6  © 5761-8776 [employer]. Care instructions adapted under license by [employer] (which disclaims liability or warranty for this information). If you have questions about a medical condition or this instruction, always ask your healthcare professional. [place] [address] any warranty or liability for your use of this information. social work

## 2022-05-13 NOTE — PATIENT PROFILE ADULT - FALL HARM RISK - HARM RISK INTERVENTIONS

## 2022-05-13 NOTE — PROGRESS NOTE ADULT - PROBLEM SELECTOR PLAN 3
- Not on tamsulosin, denies any issues urinating currently  - Will place on bladder scans q8h for PVR check, consider restarting if patient with retention

## 2022-05-13 NOTE — H&P ADULT - PROBLEM SELECTOR PLAN 5
DVT ppx - Low risk, will place on SCDs  Diet - DASH/CC diet  Activity - Ambulate with assistance  Fall, Aspiration, safety and seizure precautions.  Errol calvin

## 2022-05-13 NOTE — PROGRESS NOTE ADULT - PROBLEM SELECTOR PLAN 1
- Continue Nebulizer Q4* and Solumedrol 40 mg IVC Q8*.  - Transition to Prednisone 40 mg po when able.  - Pt started on FS QID and low dose corrective ISS while on steroid therapy.   - Check peak flow daily.  - Robitussin and Tessalon for chest congestion.   - SW eval for medication accessibility.  - Lactate mildly elevated to 2.1, will repeat in AM Pt with known history of Asthma- not on home treatment  - Transitioned off IV Solumedrol to Prednisone 40 mg.  - Switched from albuterol to Xopenex d/t tachycardia   - Check peak flow daily.  - Robitussin and Tessalon for chest congestion.   - SW eval for medication accessibility.  - Elevated lactate; will repeat VBG in the AM  - Daily FSGs and ISS while on steroids

## 2022-05-13 NOTE — MEDICAL STUDENT PROGRESS NOTE(EDUCATION) - NS MD HP STUD ASPLAN ASSES FT
71-year-old male with a PMHx of poor medical compliance, asthma, HTN, and BPH admitted with concerns for acute asthma exacerbation in the setting of increased SOB x4 days. Patient is currently hemodynamically stable. On arrival, BP was intermittent elevated to 191/100, which resolved s/p Labetolol 100mg. PE revealed a faint expiratory wheeze, but no evidence of acute respiratory distress. Blood work revealed a normal WBC, low D-dimer, and normal BNP. No evidence of pneumonia on CXR. SOB is improving s/p IV solumedrol and Duoneb.

## 2022-05-13 NOTE — H&P ADULT - NSHPLABSRESULTS_GEN_ALL_CORE
EKG ST @ 123b/ min, QW AVL, QT/ QTC= 346/ 495    TROP 6-> 9    CXR Clear    D Dimer = 198.  vPH= 7.35  vLactate= 2.1                          15.7   11.33 )-----------( 250      ( 12 May 2022 17:21 )             43.2   05-12    138  |  99  |  9   ----------------------------<  119<H>  5.0   |  26  |  1.02    Ca    9.4      12 May 2022 17:21    TPro  8.9<H>  /  Alb  4.3  /  TBili  0.6  /  DBili  x   /  AST  29  /  ALT  19  /  AlkPhos  106  05-12 LABS and ADDITIONAL STUDIES:                        15.7   11.33 )-----------( 250      ( 12 May 2022 17:21 )             43.2   05-12    138  |  99  |  9   ----------------------------<  119<H>  5.0   |  26  |  1.02    Ca    9.4      12 May 2022 17:21    TPro  8.9<H>  /  Alb  4.3  /  TBili  0.6  /  DBili  x   /  AST  29  /  ALT  19  /  AlkPhos  106  05-12    TROP 6-> 9    CXR Clear    D Dimer = 198.  vPH= 7.35  vLactate= 2.1    EKG ST @ 123b/ min, QW AVL, QT/ QTC= 346/ 495, no significant ST-T wave changes

## 2022-05-13 NOTE — DISCHARGE NOTE PROVIDER - NSDCCPCAREPLAN_GEN_ALL_CORE_FT
PRINCIPAL DISCHARGE DIAGNOSIS  Diagnosis: Acute asthma exacerbation  Assessment and Plan of Treatment: You came in with wheezing and shortness of breath and were found to have an asthma exacerbation. You were given IV steroids and inhalers which you did well on. You will need to continue your prednisone (steroid) as instructed. You were started on an inhaler called Symbicort which you will need to continue indefinitely. Please establish care with a PCP to continue this medication going forward. Return to the ED if you experience worsening symptoms.

## 2022-05-13 NOTE — DISCHARGE NOTE PROVIDER - NSCORESITESY/N_GEN_A_CORE_RD
[facility]    Hematology/Oncology Consult Note      Date of Admission:  11/7/2020  Date of Consult:  11/07/20  Reason for Consult: homozygous Factor V Leiden mutation, history of VTE      ASSESSMENT/PLAN:  Caroline Barajas is a 52 year old female with:    1) History of recurrent VTE, factor V Leiden homozygosity: Saw Dr. Benavides in May 2018, who recommended that she continue on lifelong anticoagulation.  She has history of recurrent DVT, and has had recurrence while off of anticoagulation for brief period of 6 weeks.  Due to her history of recurrent clot, she was recommended for lifelong anticoagulation.  Patient says that she has not had recent issues with bleeding on apixaban.  She has also tried various other agents in the past.      Patient saw hepatology yesterday, and due to her severe liver disease and higher risk of bleeding, it was recommended that she stop apixaban.  Hepatology did message Dr. Benavides for her opinion.      I discussed this with the patient today.  Patient says that she would be uncomfortable stopping anticoagulation due to her history of recurrent clots.      She may have a paracentesis coming later today, so will hold anticoagulation for now for procedure.    After that, I would probably favor continuing her on her home apixaban, considering her high risk of clotting.  She thinks that she is on lower dose of 2.5 mg twice a day.  However, she also has high risk of bleeding, and she will need close follow-up with her hepatologist and hematologist after discharge to help manage her anticoagulation in this complex patient.      -hold apixaban for now for procedures  -considering resuming it upon discharge, until she follows up with Dr. Benavides in the hematology clinic outpatient    2) Alcoholic hepatitis:   -GI consulted  -possible paracentesis by radiology  -follow-up with hepatology      HISTORY OF PRESENT ILLNESS: Caroline Barajas is a 52 year old female who presents with  "concern for SBP.  She has history of alcoholic dependence and hospitalizations for severe alcohol hepatitis.  She was recently admitted to the hospital in October 2020 and then to TCU after that.  She returned home 3 days ago.  The last 1-2 days, she has had increased dizziness.  She was seen by Dr. Tari Moser yesterday at the [facility] hepatology clinic.  She has leukocytosis and there was concern for possible SBP.  She was advised to go the ED.  Paracentesis was attempted in the ED, but failed.  She was transferred to [facility] for admission.    Patient has seen Dr. Benavides in the [facility] hematology clinic in May 2018 for anticoagulation issues.  Per her clinic note:    \"In 2007 her sister was diagnosed with factor V Leiden heterozygous.  Patient was tested at that time and was found to have Factor V Leiden homozygous.  She had no history of clots at that time and has had 2 normal pregnancies.  In summer 2011 she had a  long car ride.  In July 2011 she presented with worsening shortness of breath and was noted to have bilateral pulmonary emboli, multiple as well as left lower extremity DVT in the left side.  She was placed on anticoagulation with Lovenox and then switched to warfarin.  She also had an IVC filter placed at that time.IVC filter was later removed. She remained in warfarin for approximately 12 months. Warfarin was then stopped because of hair loss as a major side effect.  She was switched to Lovenox subcutaneous injection once daily.  In June 2015 she was again switched to fondaparinux, given the risk for bone loss with prolonged heparin therapy.  Patient did not want to take Xarelto at that point due to cost issues as well as due to worry about side effects.     Since being on fondaparinux since June 2015, she was doing well until she had a large retroperitoneal hematoma in September 2016. Interestingly, the day before diagnosis she had had an endometrial biopsy, " "although she reports that she was having some right lower abdominal pain even before that procedure.  Anticoagulation was stopped and IVC filter was placed. On 12/5/16, while off anticoagulation, she developed a new right lower extremity DVT and clot in IVC filter. She was started on heparin and transitioned to dabigatran (Pradaxa),  and  IVC filter was removed on 2/13/17 after clot resolved. She was switched from pradaxa to apixiban due to insurance issues.\"    Since then, she has been doing fine on apixaban.  She thinks that she is taking 2.5 mg twice a day.  She denies bleeding problems with apixaban.  She has history of gastric bypass surgery in 1998.            REVIEW OF SYSTEMS:   14 point ROS was reviewed and is negative other than as noted above in HPI.       MEDICATIONS:  Current Facility-Administered Medications   Medication     cefTRIAXone (ROCEPHIN) 2 g vial to attach to  ml bag for ADULTS or NS 50 ml bag for PEDS     lactulose (CHRONULAC) solution 15 mL     levothyroxine (SYNTHROID/LEVOTHROID) tablet 88 mcg     lidocaine (LMX4) cream     lidocaine 1 % 0.1-1 mL     melatonin tablet 1 mg     midodrine (PROAMATINE) tablet 10 mg     pantoprazole (PROTONIX) EC tablet 40 mg     polyethylene glycol (MIRALAX) Packet 17 g     potassium chloride ER (KLOR-CON M) CR tablet 20 mEq     potassium chloride ER (KLOR-CON M) CR tablet 40 mEq     QUEtiapine (SEROquel) tablet 300 mg     rifaximin (XIFAXAN) tablet 550 mg     senna-docusate (SENOKOT-S/PERICOLACE) 8.6-50 MG per tablet 1 tablet    Or     senna-docusate (SENOKOT-S/PERICOLACE) 8.6-50 MG per tablet 2 tablet     sodium chloride (PF) 0.9% PF flush 3 mL     sodium chloride (PF) 0.9% PF flush 3 mL         ALLERGIES:  No Known Allergies      PAST MEDICAL HISTORY:  Past Medical History:   Diagnosis Date     BACKACHE NOS 1/19/2007     EDEMA 3/26/2008     INSOMNIA NEC 1/19/2007     LUMBAR DISC DISPLACEMENT 2/13/2007     LUMBOSACRAL NEURITIS NOS 2/13/2007     OPIOID " DEPENDENCE-CONTIN 1/19/2007     PRIMARY HYPERCOAGULABLE STATE 3/26/2008    HOMOZYGOUS FOR FACTOR V LEIDEN MUTATION PER PT REPORT         PAST SURGICAL HISTORY:  No past surgical history on file.      SOCIAL HISTORY:  Social History     Socioeconomic History     Marital status:      Spouse name: Not on file     Number of children: Not on file     Years of education: Not on file     Highest education level: Not on file   Occupational History     Not on file   Social Needs     Financial resource strain: Not on file     Food insecurity     Worry: Not on file     Inability: Not on file     Transportation needs     Medical: Not on file     Non-medical: Not on file   Tobacco Use     Smoking status: Never Smoker     Smokeless tobacco: Never Used   Substance and Sexual Activity     Alcohol use: Not Currently     Comment: Last drink 9/30/2020     Drug use: Not Currently     Comment: past marijuana use     Sexual activity: Not on file   Lifestyle     Physical activity     Days per week: Not on file     Minutes per session: Not on file     Stress: Not on file   Relationships     Social connections     Talks on phone: Not on file     Gets together: Not on file     Attends [employer] service: Not on file     Active member of club or organization: Not on file     Attends meetings of clubs or organizations: Not on file     Relationship status: Not on file     Intimate partner violence     Fear of current or ex partner: Not on file     Emotionally abused: Not on file     Physically abused: Not on file     Forced sexual activity: Not on file   Other Topics Concern     Not on file   Social History Narrative     Not on file         FAMILY HISTORY:  Family History   Problem Relation Age of Onset     Blood Disease Sister         Factor V Leiden mutation and hx DVT         PHYSICAL EXAM:  Vital signs:  Temp: 97.8  F (36.6  C) Temp src: Oral BP: (!) 84/34 Pulse: 80   Resp: 16 SpO2: 96 % O2 Device: None (Room air)     Weight: 84 kg  "(185 lb 1.6 oz)  Estimated body mass index is 29.43 kg/m  as calculated from the following:    Height as of 11/6/20: 1.689 m (5' 6.5\").    Weight as of this encounter: 84 kg (185 lb 1.6 oz).    GENERAL/CONSTITUTIONAL: No acute distress.  EYES: + Scleral icterus.  RESPIRATORY: Clear to auscultation bilaterally. No crackles or wheezing.   CARDIOVASCULAR: Regular rate and rhythm without murmurs, gallops, or rubs.  GASTROINTESTINAL: +Distended, non-tender.  MUSCULOSKELETAL: Warm and well-perfused.  NEUROLOGIC: Alert, oriented, answers questions appropriately.  INTEGUMENTARY: +Jaundice.      LABS:  CBC RESULTS:   Recent Labs   Lab Test 11/07/20  0556   WBC 16.5*   RBC 2.37*   HGB 8.3*   HCT 26.5*   *   MCH 35.0*   MCHC 31.3*   RDW 19.4*          Recent Labs   Lab Test 11/07/20  0344 11/06/20  1159    136   POTASSIUM 3.0* 3.5   CHLORIDE 109 103   CO2 19* 21   ANIONGAP 12 11   GLC 93 139*   BUN 16 17   CR 1.06* 1.02   DEONTE 7.8* 8.8         Thank you for the opportunity to participate in this patient's care.  Please call with any questions.    Omaira Moya MD  Hematology/Oncology  [facility]    " No

## 2022-05-13 NOTE — PROGRESS NOTE ADULT - SUBJECTIVE AND OBJECTIVE BOX
Vivek Contreras PGY-1  Pager: NS) [phone]/ (WTA) [phone]  Patient is a 71y old  Male who presents with a chief complaint of Wheezing x 5 days (13 May 2022 01:29)      SUBJECTIVE / OVERNIGHT EVENTS:  No acute events over night. Denies any fevers/chills, headache, CP, SOB, abd pain, N/V/D, constipation, or leg swelling.       MEDICATIONS  (STANDING):  dextrose 5%. 1000 milliLiter(s) (50 mL/Hr) IV Continuous <Continuous>  dextrose 5%. 1000 milliLiter(s) (100 mL/Hr) IV Continuous <Continuous>  dextrose 50% Injectable 25 Gram(s) IV Push once  dextrose 50% Injectable 12.5 Gram(s) IV Push once  dextrose 50% Injectable 25 Gram(s) IV Push once  glucagon  Injectable 1 milliGRAM(s) IntraMuscular once  insulin lispro (ADMELOG) corrective regimen sliding scale   SubCutaneous three times a day before meals  insulin lispro (ADMELOG) corrective regimen sliding scale   SubCutaneous at bedtime  labetalol 100 milliGRAM(s) Oral two times a day  methylPREDNISolone sodium succinate Injectable 40 milliGRAM(s) IV Push every 8 hours    MEDICATIONS  (PRN):  acetaminophen     Tablet .. 650 milliGRAM(s) Oral every 6 hours PRN Temp greater or equal to 38C (100.4F), Mild Pain (1 - 3)  aluminum hydroxide/magnesium hydroxide/simethicone Suspension 30 milliLiter(s) Oral every 4 hours PRN Dyspepsia  benzonatate 100 milliGRAM(s) Oral every 8 hours PRN Cough  dextrose Oral Gel 15 Gram(s) Oral once PRN Blood Glucose LESS THAN 70 milliGRAM(s)/deciliter  guaiFENesin Oral Liquid (Sugar-Free) 200 milliGRAM(s) Oral every 6 hours PRN Cough  melatonin 3 milliGRAM(s) Oral at bedtime PRN Insomnia  ondansetron Injectable 4 milliGRAM(s) IV Push every 8 hours PRN Nausea and/or Vomiting          OBJECTIVE:  Vital Signs Last 24 Hrs  T(C): 37.4 (13 May 2022 06:00), Max: 37.4 (13 May 2022 06:00)  T(F): 99.3 (13 May 2022 06:00), Max: 99.3 (13 May 2022 06:00)  HR: 113 (13 May 2022 06:00) (101 - 124)  BP: 132/70 (13 May 2022 06:00) (117/101 - 199/109)  BP(mean): --  RR: 16 (13 May 2022 06:00) (16 - 30)  SpO2: 96% (13 May 2022 06:00) (95% - 100%)  PHYSICAL EXAM:  GENERAL: NAD, well-developed  HEAD:  Atraumatic, Normocephalic  EYES: conjunctiva and sclera clear  NECK: Supple, No JVD  CHEST/LUNG: Clear to auscultation bilaterally; No wheeze  HEART: Regular rate and rhythm; No murmurs, rubs, or gallops  ABDOMEN: Soft, Nontender, Nondistended; Bowel sounds present  EXTREMITIES:  No clubbing, cyanosis, or edema  PSYCH: AAOx3  NEUROLOGY: non-focal  SKIN: No rashes or lesions    CAPILLARY BLOOD GLUCOSE      POCT Blood Glucose.: 224 mg/dL (13 May 2022 02:26)    I&O's Summary    12 May 2022 07:01  -  13 May 2022 07:00  --------------------------------------------------------  IN: 0 mL / OUT: 100 mL / NET: -100 mL              LABS:                        14.7   8.78  )-----------( 235      ( 13 May 2022 06:15 )             40.6     05-13    136  |  100  |  15  ----------------------------<  189<H>  4.0   |  23  |  1.14    Ca    9.3      13 May 2022 06:15  Phos  1.6     05-13  Mg     2.00     05-13    TPro  8.9<H>  /  Alb  4.3  /  TBili  0.6  /  DBili  x   /  AST  29  /  ALT  19  /  AlkPhos  106  05-12      CARDIAC MARKERS ( 13 May 2022 00:20 )  x     / x     / 155 U/L / x     / 5.5 ng/mL              RADIOLOGY & ADDITIONAL TESTS:       Vivek Contreras PGY-1  Pager: NS) [phone]/ (UIX) [phone]  Patient is a 71y old  Male who presents with a chief complaint of Wheezing x 5 days (13 May 2022 01:29)    SUBJECTIVE / OVERNIGHT EVENTS:  -No acute events over night. Denies any fevers/chills, headache, CP, SOB, abd pain, N/V/D, constipation, or leg swelling.   -Assessed at bedside. Endorsing improvement in breathing and tightness after neb treatment.      MEDICATIONS  (STANDING):  dextrose 5%. 1000 milliLiter(s) (50 mL/Hr) IV Continuous <Continuous>  dextrose 5%. 1000 milliLiter(s) (100 mL/Hr) IV Continuous <Continuous>  dextrose 50% Injectable 25 Gram(s) IV Push once  dextrose 50% Injectable 12.5 Gram(s) IV Push once  dextrose 50% Injectable 25 Gram(s) IV Push once  glucagon  Injectable 1 milliGRAM(s) IntraMuscular once  insulin lispro (ADMELOG) corrective regimen sliding scale   SubCutaneous three times a day before meals  insulin lispro (ADMELOG) corrective regimen sliding scale   SubCutaneous at bedtime  labetalol 100 milliGRAM(s) Oral two times a day  methylPREDNISolone sodium succinate Injectable 40 milliGRAM(s) IV Push every 8 hours    MEDICATIONS  (PRN):  acetaminophen     Tablet .. 650 milliGRAM(s) Oral every 6 hours PRN Temp greater or equal to 38C (100.4F), Mild Pain (1 - 3)  aluminum hydroxide/magnesium hydroxide/simethicone Suspension 30 milliLiter(s) Oral every 4 hours PRN Dyspepsia  benzonatate 100 milliGRAM(s) Oral every 8 hours PRN Cough  dextrose Oral Gel 15 Gram(s) Oral once PRN Blood Glucose LESS THAN 70 milliGRAM(s)/deciliter  guaiFENesin Oral Liquid (Sugar-Free) 200 milliGRAM(s) Oral every 6 hours PRN Cough  melatonin 3 milliGRAM(s) Oral at bedtime PRN Insomnia  ondansetron Injectable 4 milliGRAM(s) IV Push every 8 hours PRN Nausea and/or Vomiting    OBJECTIVE:  Vital Signs Last 24 Hrs  T(C): 37.4 (13 May 2022 06:00), Max: 37.4 (13 May 2022 06:00)  T(F): 99.3 (13 May 2022 06:00), Max: 99.3 (13 May 2022 06:00)  HR: 113 (13 May 2022 06:00) (101 - 124)  BP: 132/70 (13 May 2022 06:00) (117/101 - 199/109)  BP(mean): --  RR: 16 (13 May 2022 06:00) (16 - 30)  SpO2: 96% (13 May 2022 06:00) (95% - 100%)    PHYSICAL EXAM:  GENERAL: NAD, well-developed.   HEAD:  Atraumatic, Normocephalic  EYES: conjunctiva and sclera clear  NECK: Supple, No JVD  CHEST/LUNG: Clear to auscultation bilaterally; Mild wheezing in right base. No labored breathing. No crackles or rhonchi.  HEART: Regular rate and rhythm; No murmurs, rubs, or gallops  ABDOMEN: Soft, Nontender, Nondistended; Bowel sounds present  EXTREMITIES:  No clubbing, cyanosis, or edema  PSYCH: AAOx3  NEUROLOGY: non-focal  SKIN: No rashes or lesions    CAPILLARY BLOOD GLUCOSE      POCT Blood Glucose.: 224 mg/dL (13 May 2022 02:26)    I&O's Summary    12 May 2022 07:01  -  13 May 2022 07:00  --------------------------------------------------------  IN: 0 mL / OUT: 100 mL / NET: -100 mL              LABS:                        14.7   8.78  )-----------( 235      ( 13 May 2022 06:15 )             40.6     05-13    136  |  100  |  15  ----------------------------<  189<H>  4.0   |  23  |  1.14    Ca    9.3      13 May 2022 06:15  Phos  1.6     05-13  Mg     2.00     05-13    TPro  8.9<H>  /  Alb  4.3  /  TBili  0.6  /  DBili  x   /  AST  29  /  ALT  19  /  AlkPhos  106  05-12      CARDIAC MARKERS ( 13 May 2022 00:20 )  x     / x     / 155 U/L / x     / 5.5 ng/mL              RADIOLOGY & ADDITIONAL TESTS:

## 2022-05-13 NOTE — H&P ADULT - HISTORY OF PRESENT ILLNESS
Tuval[place] Creole speaking, history provided by  services, agent # [id number]. Rk Leslie'    71M, self ambulating, not on any medications, no PCP, was on Tamsulosin po, but stopped a few months ago, because he was getting it through his spouse's insurance, history of asthma, never intubated, was hospitalized once, a long time ago, he can not recall how many years. Not on any inhalers or rescue inhalers. The pt began wheezing on 5/9, not sure what set it off,, but he felt worse throughout the week, with increase wheeze, SOB and can hear mucus in his chest when he lays down and breaths. Denies sick contacts, HA, dizziness, falls, blurry vision, cough, chest pain, palpitations, nausea, vomit, diarrhea, constipation, abdominal pain, dysuria, chills, diaphoresis.    In the Ed, the pt was stated on Solumedrol 40 mg IV Q8* and Duo Nebs Q4*. The pt says the treatment helped to relive his SOB, decrease the wheeze and he is currently feeling better.    Vitals: T Max= 99.1* oral, HR = 114b/ min, BP = 156/ 88, RR= 17b/ min, SPO2= 100% ra  Bel[place] Creole speaking, history provided by  services, agent # [id number]. Rk Maradiaga    This is a 71M with history of HTN, BPH, and Asthma who presents to the hospital with SOB and wheezing. Said that his symptoms started on 5/9, not sure what set it of, but states that his breathing became worse over the next few days. States that he hears "a noise" in his chest. Denies any coughing or sputum production, no URI complaints. Denies sick contacts. Reports having a prior asthma exacerbation many years ago, not on medications for his asthma currently. States that his breathing is improved at present after the steroids and duonebs. His HR is noted to be elevated to 110s-120s but he denies any chest pain, palpitations, or LOC. Otherwise denies any complaints. Of note, patient not on medications currently 2/2 insurance issues, states that he was on tamsulosin last year and on HTN medications many years ago.     On arrival to the ED, his vitals were T 99.1, P 111, /101, RR 20, O2 sat 97% RA. He was given solumedrol and duonebs x4. His lab work showed some leukocytosis with low and negative trops, low d-dimer, low proBNP, and mildly elevated lactate. His CXR showed clear lungs. He was admitted to medicine for further management.

## 2022-05-14 LAB
ANION GAP SERPL CALC-SCNC: 11 MMOL/L — SIGNIFICANT CHANGE UP (ref 7–14)
APPEARANCE UR: CLEAR — SIGNIFICANT CHANGE UP
BACTERIA # UR AUTO: ABNORMAL
BASOPHILS # BLD AUTO: 0.01 K/UL — SIGNIFICANT CHANGE UP (ref 0–0.2)
BASOPHILS NFR BLD AUTO: 0.1 % — SIGNIFICANT CHANGE UP (ref 0–2)
BILIRUB UR-MCNC: NEGATIVE — SIGNIFICANT CHANGE UP
BUN SERPL-MCNC: 19 MG/DL — SIGNIFICANT CHANGE UP (ref 7–23)
CALCIUM SERPL-MCNC: 8.8 MG/DL — SIGNIFICANT CHANGE UP (ref 8.4–10.5)
CHLORIDE SERPL-SCNC: 101 MMOL/L — SIGNIFICANT CHANGE UP (ref 98–107)
CO2 SERPL-SCNC: 25 MMOL/L — SIGNIFICANT CHANGE UP (ref 22–31)
COLOR SPEC: SIGNIFICANT CHANGE UP
CREAT SERPL-MCNC: 1.14 MG/DL — SIGNIFICANT CHANGE UP (ref 0.5–1.3)
DIFF PNL FLD: NEGATIVE — SIGNIFICANT CHANGE UP
EGFR: 68 ML/MIN/1.73M2 — SIGNIFICANT CHANGE UP
EOSINOPHIL # BLD AUTO: 0 K/UL — SIGNIFICANT CHANGE UP (ref 0–0.5)
EOSINOPHIL NFR BLD AUTO: 0 % — SIGNIFICANT CHANGE UP (ref 0–6)
EPI CELLS # UR: 0 /HPF — SIGNIFICANT CHANGE UP (ref 0–5)
GAS PNL BLDV: SIGNIFICANT CHANGE UP
GLUCOSE SERPL-MCNC: 108 MG/DL — HIGH (ref 70–99)
GLUCOSE UR QL: NEGATIVE — SIGNIFICANT CHANGE UP
HCT VFR BLD CALC: 37.6 % — LOW (ref 39–50)
HCV AB S/CO SERPL IA: 0.18 S/CO — SIGNIFICANT CHANGE UP (ref 0–0.99)
HCV AB SERPL-IMP: SIGNIFICANT CHANGE UP
HGB BLD-MCNC: 13.9 G/DL — SIGNIFICANT CHANGE UP (ref 13–17)
HYALINE CASTS # UR AUTO: 1 /LPF — SIGNIFICANT CHANGE UP (ref 0–7)
IANC: 13.86 K/UL — HIGH (ref 1.8–7.4)
IMM GRANULOCYTES NFR BLD AUTO: 0.5 % — SIGNIFICANT CHANGE UP (ref 0–1.5)
KETONES UR-MCNC: NEGATIVE — SIGNIFICANT CHANGE UP
LEUKOCYTE ESTERASE UR-ACNC: NEGATIVE — SIGNIFICANT CHANGE UP
LYMPHOCYTES # BLD AUTO: 13.6 % — SIGNIFICANT CHANGE UP (ref 13–44)
LYMPHOCYTES # BLD AUTO: 2.44 K/UL — SIGNIFICANT CHANGE UP (ref 1–3.3)
MAGNESIUM SERPL-MCNC: 2.1 MG/DL — SIGNIFICANT CHANGE UP (ref 1.6–2.6)
MCHC RBC-ENTMCNC: 29.8 PG — SIGNIFICANT CHANGE UP (ref 27–34)
MCHC RBC-ENTMCNC: 37 GM/DL — HIGH (ref 32–36)
MCV RBC AUTO: 80.5 FL — SIGNIFICANT CHANGE UP (ref 80–100)
MONOCYTES # BLD AUTO: 1.56 K/UL — HIGH (ref 0–0.9)
MONOCYTES NFR BLD AUTO: 8.7 % — SIGNIFICANT CHANGE UP (ref 2–14)
NEUTROPHILS # BLD AUTO: 13.86 K/UL — HIGH (ref 1.8–7.4)
NEUTROPHILS NFR BLD AUTO: 77.1 % — HIGH (ref 43–77)
NITRITE UR-MCNC: NEGATIVE — SIGNIFICANT CHANGE UP
NRBC # BLD: 0 /100 WBCS — SIGNIFICANT CHANGE UP
NRBC # FLD: 0 K/UL — SIGNIFICANT CHANGE UP
PH UR: 6.5 — SIGNIFICANT CHANGE UP (ref 5–8)
PHOSPHATE SERPL-MCNC: 3.6 MG/DL — SIGNIFICANT CHANGE UP (ref 2.5–4.5)
PLATELET # BLD AUTO: 223 K/UL — SIGNIFICANT CHANGE UP (ref 150–400)
POTASSIUM SERPL-MCNC: 3.8 MMOL/L — SIGNIFICANT CHANGE UP (ref 3.5–5.3)
POTASSIUM SERPL-SCNC: 3.8 MMOL/L — SIGNIFICANT CHANGE UP (ref 3.5–5.3)
PROT UR-MCNC: ABNORMAL
RBC # BLD: 4.67 M/UL — SIGNIFICANT CHANGE UP (ref 4.2–5.8)
RBC # FLD: 14.3 % — SIGNIFICANT CHANGE UP (ref 10.3–14.5)
RBC CASTS # UR COMP ASSIST: 3 /HPF — SIGNIFICANT CHANGE UP (ref 0–4)
SODIUM SERPL-SCNC: 137 MMOL/L — SIGNIFICANT CHANGE UP (ref 135–145)
SP GR SPEC: 1.02 — SIGNIFICANT CHANGE UP (ref 1–1.05)
UROBILINOGEN FLD QL: SIGNIFICANT CHANGE UP
WBC # BLD: 17.78 K/UL — HIGH (ref 3.8–10.5)
WBC # FLD AUTO: 17.78 K/UL — HIGH (ref 3.8–10.5)
WBC UR QL: 21 /HPF — HIGH (ref 0–5)

## 2022-05-14 PROCEDURE — 99233 SBSQ HOSP IP/OBS HIGH 50: CPT | Mod: GC

## 2022-05-14 RX ORDER — ACETAMINOPHEN 500 MG
2 TABLET ORAL
Qty: 0 | Refills: 0 | DISCHARGE
Start: 2022-05-14

## 2022-05-14 RX ORDER — BUDESONIDE AND FORMOTEROL FUMARATE DIHYDRATE 160; 4.5 UG/1; UG/1
2 AEROSOL RESPIRATORY (INHALATION)
Qty: 1 | Refills: 0
Start: 2022-05-14 | End: 2022-06-12

## 2022-05-14 RX ORDER — LABETALOL HCL 100 MG
1 TABLET ORAL
Qty: 60 | Refills: 0
Start: 2022-05-14 | End: 2022-06-12

## 2022-05-14 RX ORDER — ALBUTEROL 90 UG/1
2 AEROSOL, METERED ORAL
Qty: 1 | Refills: 0
Start: 2022-05-14 | End: 2022-06-12

## 2022-05-14 RX ORDER — TAMSULOSIN HYDROCHLORIDE 0.4 MG/1
1 CAPSULE ORAL
Qty: 0 | Refills: 0 | DISCHARGE
Start: 2022-05-14

## 2022-05-14 RX ADMIN — BUDESONIDE AND FORMOTEROL FUMARATE DIHYDRATE 2 PUFF(S): 160; 4.5 AEROSOL RESPIRATORY (INHALATION) at 09:45

## 2022-05-14 RX ADMIN — LEVALBUTEROL 0.63 MILLIGRAM(S): 1.25 SOLUTION, CONCENTRATE RESPIRATORY (INHALATION) at 15:29

## 2022-05-14 RX ADMIN — TAMSULOSIN HYDROCHLORIDE 0.4 MILLIGRAM(S): 0.4 CAPSULE ORAL at 21:44

## 2022-05-14 RX ADMIN — Medication 40 MILLIGRAM(S): at 05:47

## 2022-05-14 RX ADMIN — BUDESONIDE AND FORMOTEROL FUMARATE DIHYDRATE 2 PUFF(S): 160; 4.5 AEROSOL RESPIRATORY (INHALATION) at 21:33

## 2022-05-14 RX ADMIN — LEVALBUTEROL 0.63 MILLIGRAM(S): 1.25 SOLUTION, CONCENTRATE RESPIRATORY (INHALATION) at 10:30

## 2022-05-14 RX ADMIN — ENOXAPARIN SODIUM 40 MILLIGRAM(S): 100 INJECTION SUBCUTANEOUS at 21:44

## 2022-05-14 RX ADMIN — LEVALBUTEROL 0.63 MILLIGRAM(S): 1.25 SOLUTION, CONCENTRATE RESPIRATORY (INHALATION) at 04:41

## 2022-05-14 RX ADMIN — Medication 100 MILLIGRAM(S): at 05:46

## 2022-05-14 RX ADMIN — LEVALBUTEROL 0.63 MILLIGRAM(S): 1.25 SOLUTION, CONCENTRATE RESPIRATORY (INHALATION) at 22:59

## 2022-05-14 RX ADMIN — Medication 100 MILLIGRAM(S): at 17:24

## 2022-05-14 NOTE — PROGRESS NOTE ADULT - PROBLEM SELECTOR PLAN 3
- Not on tamsulosin, denies any issues urinating currently  - Will place on bladder scans q8h for PVR check, consider restarting if patient with retention -flomax

## 2022-05-14 NOTE — PHYSICAL THERAPY INITIAL EVALUATION ADULT - LEVEL OF INDEPENDENCE: SUPINE/SIT, REHAB EVAL
Results discussed with patient   Small mesenteric fat bulge with umbilical pain. Questionable hernia?  Advised to arrange to see general surgery  Adnexal complex cyst, compared to 2015, size has decreased.  Advised to arrange to see gynecology for further follow-up and testing.  Patient understood and agreed    Please fax results to Dr. Zuluaga. Patient received and left sitting at the edge of the bed

## 2022-05-14 NOTE — PROGRESS NOTE ADULT - PROBLEM SELECTOR PLAN 1
Pt with known history of Asthma- not on home treatment  - Transitioned off IV Solumedrol to Prednisone 40 mg.  - Switched from albuterol to Xopenex d/t tachycardia   - Check peak flow daily.  - Robitussin and Tessalon for chest congestion.   - SW eval for medication accessibility.  - Elevated lactate; will repeat VBG in the AM  - Daily FSGs and ISS while on steroids Pt with known history of Asthma- not on home treatment  - Transitioned off IV Solumedrol to Prednisone 40 mg.  - Switched from albuterol to Xopenex d/t tachycardia   - Check peak flow daily.  - Robitussin and Tessalon for chest congestion.   - Daily FSGs and ISS while on steroids  - needs symbicort as maintenance medication

## 2022-05-14 NOTE — PROGRESS NOTE ADULT - ASSESSMENT
71M history of asthma, with one prior hospitalization many years ago, not on any medications, admitted for asthma exacerbation, with symptomatic improvement after neb treatment, s/p IV solumedrol transitioned to PO prednisone 50mg. Pending PT consult for dispo planning.

## 2022-05-14 NOTE — PHYSICAL THERAPY INITIAL EVALUATION ADULT - PERTINENT HX OF CURRENT PROBLEM, REHAB EVAL
Patient is a 71 year old male admitted to [facility] with SOB and wheezing. PHM HTN, BPH, and Asthma.

## 2022-05-14 NOTE — PHYSICAL THERAPY INITIAL EVALUATION ADULT - PATIENT PROFILE REVIEW, REHAB EVAL
PT orders received: ambulate with assistance. Consult with RN Anne, pt may participate in PT evaluation./yes

## 2022-05-14 NOTE — PROGRESS NOTE ADULT - PROBLEM SELECTOR PLAN 4
- Not on medications, BPs here noted to be elevated  - Given his sinus tach and elevated BPs will place patient on BB with labetalol 100mg BID, monitor response to medication  - Will likely need PCP appointment on discharge for follow up of his BP and other medical issues  - A1C: 5.5  - Lipid panel sent

## 2022-05-14 NOTE — PHYSICAL THERAPY INITIAL EVALUATION ADULT - PREDICTED DURATION OF THERAPY (DAYS/WKS), PT EVAL
Patient demonstrating safe ambulation. Patient performing at functional baseline. Patient will not be placed on PT program.

## 2022-05-14 NOTE — PROGRESS NOTE ADULT - PROBLEM SELECTOR PLAN 5
-DVT ppx - HSQ  -Diet - DASH/CC diet  -Activity - Ambulate with assistance  -Fall, Aspiration, safety and seizure precautions.  -JOE calvin -DVT ppx - HSQ  -Diet - DASH/CC diet  -Activity - Ambulate with assistance  -Fall, Aspiration, safety and seizure precautions.

## 2022-05-14 NOTE — PROGRESS NOTE ADULT - PROBLEM SELECTOR PLAN 2
- D Dimer = 198.  - Low suspicion for P.E.   - Albuterol nebs Q4* changed to Xopenex Neb Q4* to decrease S.E. of tachycardia.  - TSH wnl  - Monitor HR with treatment of his asthma, if not improving then consider further evaluation (?Tele or TTE)

## 2022-05-14 NOTE — PROGRESS NOTE ADULT - SUBJECTIVE AND OBJECTIVE BOX
PROGRESS NOTE:   Authored by Nikki Kimura, MD    Patient is a 71y old  Male who presents with a chief complaint of Wheezing x 5 days (13 May 2022 16:29)      SUBJECTIVE / OVERNIGHT EVENTS:    ADDITIONAL REVIEW OF SYSTEMS:    MEDICATIONS  (STANDING):  budesonide  80 MICROgram(s)/formoterol 4.5 MICROgram(s) Inhaler 2 Puff(s) Inhalation two times a day  enoxaparin Injectable 40 milliGRAM(s) SubCutaneous every 24 hours  labetalol 100 milliGRAM(s) Oral two times a day  levalbuterol Inhalation 0.63 milliGRAM(s) Inhalation every 6 hours  predniSONE   Tablet 40 milliGRAM(s) Oral daily  tamsulosin 0.4 milliGRAM(s) Oral at bedtime    MEDICATIONS  (PRN):  acetaminophen     Tablet .. 650 milliGRAM(s) Oral every 6 hours PRN Temp greater or equal to 38C (100.4F), Mild Pain (1 - 3)  ALBUTerol    90 MICROgram(s) HFA Inhaler 2 Puff(s) Inhalation every 6 hours PRN Shortness of Breath and/or Wheezing  benzonatate 100 milliGRAM(s) Oral every 8 hours PRN Cough  guaiFENesin Oral Liquid (Sugar-Free) 200 milliGRAM(s) Oral every 6 hours PRN Cough  melatonin 3 milliGRAM(s) Oral at bedtime PRN Insomnia  ondansetron Injectable 4 milliGRAM(s) IV Push every 8 hours PRN Nausea and/or Vomiting      CAPILLARY BLOOD GLUCOSE      POCT Blood Glucose.: 149 mg/dL (13 May 2022 22:00)  POCT Blood Glucose.: 144 mg/dL (13 May 2022 12:19)  POCT Blood Glucose.: 143 mg/dL (13 May 2022 08:49)    I&O's Summary    13 May 2022 07:01  -  14 May 2022 07:00  --------------------------------------------------------  IN: 1300 mL / OUT: 800 mL / NET: 500 mL        PHYSICAL EXAM:  Vital Signs Last 24 Hrs  T(C): 36.7 (14 May 2022 05:45), Max: 37.2 (13 May 2022 12:00)  T(F): 98.1 (14 May 2022 05:45), Max: 98.9 (13 May 2022 12:00)  HR: 103 (14 May 2022 05:45) (86 - 108)  BP: 151/89 (14 May 2022 05:45) (138/65 - 155/73)  BP(mean): --  RR: 19 (14 May 2022 05:45) (16 - 19)  SpO2: 97% (14 May 2022 05:45) (96% - 97%)    GENERAL: NAD, well-developed.   HEAD:  Atraumatic, Normocephalic  EYES: conjunctiva and sclera clear  NECK: Supple, No JVD  CHEST/LUNG: Clear to auscultation bilaterally; Mild wheezing in right base. No labored breathing. No crackles or rhonchi.  HEART: Regular rate and rhythm; No murmurs, rubs, or gallops  ABDOMEN: Soft, Nontender, Nondistended; Bowel sounds present  EXTREMITIES:  No clubbing, cyanosis, or edema  PSYCH: AAOx3  NEUROLOGY: non-focal  SKIN: No rashes or lesions    LABS:                        14.7   8.78  )-----------( 235      ( 13 May 2022 06:15 )             40.6     05-13    136  |  100  |  15  ----------------------------<  189<H>  4.0   |  23  |  1.14    Ca    9.3      13 May 2022 06:15  Phos  1.6     05-13  Mg     2.00     05-13    TPro  8.9<H>  /  Alb  4.3  /  TBili  0.6  /  DBili  x   /  AST  29  /  ALT  19  /  AlkPhos  106  05-12      CARDIAC MARKERS ( 13 May 2022 00:20 )  x     / x     / 155 U/L / x     / 5.5 ng/mL      Urinalysis Basic - ( 14 May 2022 03:50 )    Color: Light Yellow / Appearance: Clear / S.017 / pH: x  Gluc: x / Ketone: Negative  / Bili: Negative / Urobili: <2 mg/dL   Blood: x / Protein: 30 mg/dL / Nitrite: Negative   Leuk Esterase: Negative / RBC: 3 /HPF / WBC 21 /HPF   Sq Epi: x / Non Sq Epi: 0 /HPF / Bacteria: Occasional          RADIOLOGY & ADDITIONAL TESTS:  Results Reviewed:   Imaging Personally Reviewed:  Electrocardiogram Personally Reviewed:    COORDINATION OF CARE:  Care Discussed with Consultants/Other Providers [Y/N]:  Prior or Outpatient Records Reviewed [Y/N]:   PROGRESS NOTE:   Authored by Nikki Kimura, MD    Patient is a 71y old  Male who presents with a chief complaint of Wheezing x 5 days (13 May 2022 16:29)      SUBJECTIVE / OVERNIGHT EVENTS: No acute events overnight. Pt seen and examined at bedside via Creole  [id number]. Denies fever, chills, nausea, vomiting, cp, or sob. Feels well and would like to go home.     ADDITIONAL REVIEW OF SYSTEMS:    MEDICATIONS  (STANDING):  budesonide  80 MICROgram(s)/formoterol 4.5 MICROgram(s) Inhaler 2 Puff(s) Inhalation two times a day  enoxaparin Injectable 40 milliGRAM(s) SubCutaneous every 24 hours  labetalol 100 milliGRAM(s) Oral two times a day  levalbuterol Inhalation 0.63 milliGRAM(s) Inhalation every 6 hours  predniSONE   Tablet 40 milliGRAM(s) Oral daily  tamsulosin 0.4 milliGRAM(s) Oral at bedtime    MEDICATIONS  (PRN):  acetaminophen     Tablet .. 650 milliGRAM(s) Oral every 6 hours PRN Temp greater or equal to 38C (100.4F), Mild Pain (1 - 3)  ALBUTerol    90 MICROgram(s) HFA Inhaler 2 Puff(s) Inhalation every 6 hours PRN Shortness of Breath and/or Wheezing  benzonatate 100 milliGRAM(s) Oral every 8 hours PRN Cough  guaiFENesin Oral Liquid (Sugar-Free) 200 milliGRAM(s) Oral every 6 hours PRN Cough  melatonin 3 milliGRAM(s) Oral at bedtime PRN Insomnia  ondansetron Injectable 4 milliGRAM(s) IV Push every 8 hours PRN Nausea and/or Vomiting      CAPILLARY BLOOD GLUCOSE      POCT Blood Glucose.: 149 mg/dL (13 May 2022 22:00)  POCT Blood Glucose.: 144 mg/dL (13 May 2022 12:19)  POCT Blood Glucose.: 143 mg/dL (13 May 2022 08:49)    I&O's Summary    13 May 2022 07:01  -  14 May 2022 07:00  --------------------------------------------------------  IN: 1300 mL / OUT: 800 mL / NET: 500 mL        PHYSICAL EXAM:  Vital Signs Last 24 Hrs  T(C): 36.7 (14 May 2022 05:45), Max: 37.2 (13 May 2022 12:00)  T(F): 98.1 (14 May 2022 05:45), Max: 98.9 (13 May 2022 12:00)  HR: 103 (14 May 2022 05:45) (86 - 108)  BP: 151/89 (14 May 2022 05:45) (138/65 - 155/73)  BP(mean): --  RR: 19 (14 May 2022 05:45) (16 - 19)  SpO2: 97% (14 May 2022 05:45) (96% - 97%)    GENERAL: NAD, well-developed.   HEAD:  Atraumatic, Normocephalic  EYES: conjunctiva and sclera clear  NECK: Supple, No JVD  CHEST/LUNG: +some wheezes. No labored breathing. No crackles or rhonchi.  HEART: Regular rate and rhythm; No murmurs, rubs, or gallops  ABDOMEN: Soft, Nontender, Nondistended; Bowel sounds present  EXTREMITIES:  No clubbing, cyanosis, or edema  PSYCH: AAOx3  NEUROLOGY: non-focal  SKIN: No rashes or lesions    LABS:                        14.7   8.78  )-----------( 235      ( 13 May 2022 06:15 )             40.6     05-13    136  |  100  |  15  ----------------------------<  189<H>  4.0   |  23  |  1.14    Ca    9.3      13 May 2022 06:15  Phos  1.6     05-13  Mg     2.00     05-13    TPro  8.9<H>  /  Alb  4.3  /  TBili  0.6  /  DBili  x   /  AST  29  /  ALT  19  /  AlkPhos  106  05-12      CARDIAC MARKERS ( 13 May 2022 00:20 )  x     / x     / 155 U/L / x     / 5.5 ng/mL      Urinalysis Basic - ( 14 May 2022 03:50 )    Color: Light Yellow / Appearance: Clear / S.017 / pH: x  Gluc: x / Ketone: Negative  / Bili: Negative / Urobili: <2 mg/dL   Blood: x / Protein: 30 mg/dL / Nitrite: Negative   Leuk Esterase: Negative / RBC: 3 /HPF / WBC 21 /HPF   Sq Epi: x / Non Sq Epi: 0 /HPF / Bacteria: Occasional          RADIOLOGY & ADDITIONAL TESTS:  Results Reviewed:   Imaging Personally Reviewed:  Electrocardiogram Personally Reviewed:    COORDINATION OF CARE:  Care Discussed with Consultants/Other Providers [Y/N]:  Prior or Outpatient Records Reviewed [Y/N]:

## 2022-05-14 NOTE — PHYSICAL THERAPY INITIAL EVALUATION ADULT - ADDITIONAL COMMENTS
Patient lives with family in a private house, 2 steps to enter. Patient reports being independent in ADLs and ambulation prior to admission.    Patient was left sitting at edge of bed, all lines/tubes intact and call bell within reach, RN aware

## 2022-05-15 VITALS
RESPIRATION RATE: 18 BRPM | SYSTOLIC BLOOD PRESSURE: 131 MMHG | HEART RATE: 96 BPM | OXYGEN SATURATION: 96 % | DIASTOLIC BLOOD PRESSURE: 77 MMHG | TEMPERATURE: 98 F

## 2022-05-15 LAB
BASOPHILS # BLD AUTO: 0.03 K/UL — SIGNIFICANT CHANGE UP (ref 0–0.2)
BASOPHILS NFR BLD AUTO: 0.2 % — SIGNIFICANT CHANGE UP (ref 0–2)
EOSINOPHIL # BLD AUTO: 0.02 K/UL — SIGNIFICANT CHANGE UP (ref 0–0.5)
EOSINOPHIL NFR BLD AUTO: 0.1 % — SIGNIFICANT CHANGE UP (ref 0–6)
HCT VFR BLD CALC: 39 % — SIGNIFICANT CHANGE UP (ref 39–50)
HGB BLD-MCNC: 14 G/DL — SIGNIFICANT CHANGE UP (ref 13–17)
IANC: 9.45 K/UL — HIGH (ref 1.8–7.4)
IMM GRANULOCYTES NFR BLD AUTO: 0.4 % — SIGNIFICANT CHANGE UP (ref 0–1.5)
LYMPHOCYTES # BLD AUTO: 24.5 % — SIGNIFICANT CHANGE UP (ref 13–44)
LYMPHOCYTES # BLD AUTO: 3.52 K/UL — HIGH (ref 1–3.3)
MCHC RBC-ENTMCNC: 30.2 PG — SIGNIFICANT CHANGE UP (ref 27–34)
MCHC RBC-ENTMCNC: 35.9 GM/DL — SIGNIFICANT CHANGE UP (ref 32–36)
MCV RBC AUTO: 84.1 FL — SIGNIFICANT CHANGE UP (ref 80–100)
MONOCYTES # BLD AUTO: 1.28 K/UL — HIGH (ref 0–0.9)
MONOCYTES NFR BLD AUTO: 8.9 % — SIGNIFICANT CHANGE UP (ref 2–14)
NEUTROPHILS # BLD AUTO: 9.45 K/UL — HIGH (ref 1.8–7.4)
NEUTROPHILS NFR BLD AUTO: 65.9 % — SIGNIFICANT CHANGE UP (ref 43–77)
NRBC # BLD: 0 /100 WBCS — SIGNIFICANT CHANGE UP
NRBC # FLD: 0 K/UL — SIGNIFICANT CHANGE UP
PLATELET # BLD AUTO: 221 K/UL — SIGNIFICANT CHANGE UP (ref 150–400)
RBC # BLD: 4.64 M/UL — SIGNIFICANT CHANGE UP (ref 4.2–5.8)
RBC # FLD: 13.9 % — SIGNIFICANT CHANGE UP (ref 10.3–14.5)
WBC # BLD: 14.36 K/UL — HIGH (ref 3.8–10.5)
WBC # FLD AUTO: 14.36 K/UL — HIGH (ref 3.8–10.5)

## 2022-05-15 PROCEDURE — 99233 SBSQ HOSP IP/OBS HIGH 50: CPT | Mod: GC

## 2022-05-15 RX ORDER — LABETALOL HCL 100 MG
200 TABLET ORAL
Refills: 0 | Status: DISCONTINUED | OUTPATIENT
Start: 2022-05-15 | End: 2022-05-15

## 2022-05-15 RX ORDER — LABETALOL HCL 100 MG
1 TABLET ORAL
Qty: 60 | Refills: 0
Start: 2022-05-15

## 2022-05-15 RX ADMIN — BUDESONIDE AND FORMOTEROL FUMARATE DIHYDRATE 2 PUFF(S): 160; 4.5 AEROSOL RESPIRATORY (INHALATION) at 10:01

## 2022-05-15 RX ADMIN — Medication 40 MILLIGRAM(S): at 06:05

## 2022-05-15 RX ADMIN — Medication 100 MILLIGRAM(S): at 06:05

## 2022-05-15 RX ADMIN — LEVALBUTEROL 0.63 MILLIGRAM(S): 1.25 SOLUTION, CONCENTRATE RESPIRATORY (INHALATION) at 09:45

## 2022-05-15 NOTE — PROGRESS NOTE ADULT - PROBLEM SELECTOR PLAN 5
-DVT ppx - HSQ  -Diet - DASH/CC diet  -Activity - Ambulate with assistance  -Fall, Aspiration, safety and seizure precautions.

## 2022-05-15 NOTE — DISCHARGE NOTE NURSING/CASE MANAGEMENT/SOCIAL WORK - PATIENT PORTAL LINK FT
You can access the FollowMyHealth Patient Portal offered by [facility] by registering at the following website: [email or web address]. By joining [employer] [facility]’s FollowMyHealth portal, you will also be able to view your health information using other applications (apps) compatible with our system.

## 2022-05-15 NOTE — PROGRESS NOTE ADULT - PROBLEM SELECTOR PLAN 2
- D Dimer = 198.  - Low suspicion for P.E.   - Albuterol nebs Q4* changed to Xopenex Neb Q4* to decrease S.E. of tachycardia.  - TSH wnl  - Monitor HR with treatment of his asthma, if not improving then consider further evaluation (?Tele or TTE) Improved  - D Dimer = 198.  - Low suspicion for P.E.   - Albuterol nebs Q4* changed to Xopenex Neb Q4* to decrease S.E. of tachycardia.  - TSH wnl  - Monitor HR with treatment of his asthma, if not improving then consider further evaluation (?Tele or TTE)

## 2022-05-15 NOTE — PROGRESS NOTE ADULT - PROBLEM SELECTOR PLAN 4
- Not on medications, BPs here noted to be elevated  - Given his sinus tach and elevated BPs will place patient on BB with labetalol 100mg BID, monitor response to medication  - Will likely need PCP appointment on discharge for follow up of his BP and other medical issues  - A1C: 5.5  - Lipid panel sent - Not on medications, BPs here noted to be elevated  - Given his sinus tach and elevated BPs will place patient on BB with labetalol 100mg BID increased to 200mg BID  - Will need PCP appointment on discharge for follow up of his BP and other medical issues  - A1C: 5.5  - Lipid panel sent

## 2022-05-15 NOTE — DISCHARGE NOTE NURSING/CASE MANAGEMENT/SOCIAL WORK - NSDCPEFALRISK_GEN_ALL_CORE
For information on Fall & Injury Prevention, visit: [email or web address] OR  [email or web address] OR  https://www.cdc.gov/steadi/patient.html

## 2022-05-15 NOTE — PROGRESS NOTE ADULT - SUBJECTIVE AND OBJECTIVE BOX
Vivek Contreras PGY-1  Pager: NS) [phone]/ (ENJ) [phone]  Patient is a 71y old  Male who presents with a chief complaint of Wheezing x 5 days (14 May 2022 07:33)      SUBJECTIVE / OVERNIGHT EVENTS:  No acute events over night. Denies any fevers/chills, headache, CP, SOB, abd pain, N/V/D, constipation, or leg swelling.       MEDICATIONS  (STANDING):  budesonide  80 MICROgram(s)/formoterol 4.5 MICROgram(s) Inhaler 2 Puff(s) Inhalation two times a day  enoxaparin Injectable 40 milliGRAM(s) SubCutaneous every 24 hours  labetalol 100 milliGRAM(s) Oral two times a day  levalbuterol Inhalation 0.63 milliGRAM(s) Inhalation every 6 hours  predniSONE   Tablet 40 milliGRAM(s) Oral daily  tamsulosin 0.4 milliGRAM(s) Oral at bedtime    MEDICATIONS  (PRN):  acetaminophen     Tablet .. 650 milliGRAM(s) Oral every 6 hours PRN Temp greater or equal to 38C (100.4F), Mild Pain (1 - 3)  ALBUTerol    90 MICROgram(s) HFA Inhaler 2 Puff(s) Inhalation every 6 hours PRN Shortness of Breath and/or Wheezing  benzonatate 100 milliGRAM(s) Oral every 8 hours PRN Cough  guaiFENesin Oral Liquid (Sugar-Free) 200 milliGRAM(s) Oral every 6 hours PRN Cough  melatonin 3 milliGRAM(s) Oral at bedtime PRN Insomnia  ondansetron Injectable 4 milliGRAM(s) IV Push every 8 hours PRN Nausea and/or Vomiting          OBJECTIVE:  Vital Signs Last 24 Hrs  T(C): 36.7 (15 May 2022 06:05), Max: 37.1 (14 May 2022 17:20)  T(F): 98 (15 May 2022 06:05), Max: 98.7 (14 May 2022 17:20)  HR: 84 (15 May 2022 06:05) (78 - 98)  BP: 151/80 (15 May 2022 06:05) (129/69 - 151/80)  BP(mean): --  RR: 18 (15 May 2022 06:05) (18 - 19)  SpO2: 97% (15 May 2022 06:05) (96% - 99%)  PHYSICAL EXAM:  GENERAL: NAD, well-developed  HEAD:  Atraumatic, Normocephalic  EYES: conjunctiva and sclera clear  NECK: Supple, No JVD  CHEST/LUNG: Clear to auscultation bilaterally; No wheeze  HEART: Regular rate and rhythm; No murmurs, rubs, or gallops  ABDOMEN: Soft, Nontender, Nondistended; Bowel sounds present  EXTREMITIES:  No clubbing, cyanosis, or edema  PSYCH: AAOx3  NEUROLOGY: non-focal  SKIN: No rashes or lesions    CAPILLARY BLOOD GLUCOSE        I&O's Summary    14 May 2022 07:01  -  15 May 2022 07:00  --------------------------------------------------------  IN: 1290 mL / OUT: 1600 mL / NET: -310 mL              LABS:                        14.0   14.36 )-----------( 221      ( 15 May 2022 05:20 )             39.0     05-14    137  |  101  |  19  ----------------------------<  108<H>  3.8   |  25  |  1.14    Ca    8.8      14 May 2022 06:27  Phos  3.6     05-14  Mg     2.10     05-14            Urinalysis Basic - ( 14 May 2022 03:50 )    Color: Light Yellow / Appearance: Clear / S.017 / pH: x  Gluc: x / Ketone: Negative  / Bili: Negative / Urobili: <2 mg/dL   Blood: x / Protein: 30 mg/dL / Nitrite: Negative   Leuk Esterase: Negative / RBC: 3 /HPF / WBC 21 /HPF   Sq Epi: x / Non Sq Epi: 0 /HPF / Bacteria: Occasional            RADIOLOGY & ADDITIONAL TESTS:       Vivek Contreras PGY-1  Pager: NS) [phone]/ (PSV) [phone]  Patient is a 71y old  Male who presents with a chief complaint of Wheezing x 5 days (14 May 2022 07:33)      SUBJECTIVE / OVERNIGHT EVENTS:  No acute events over night. Denies any fevers/chills, headache, CP, SOB, abd pain, N/V/D, constipation, or leg swelling.       MEDICATIONS  (STANDING):  budesonide  80 MICROgram(s)/formoterol 4.5 MICROgram(s) Inhaler 2 Puff(s) Inhalation two times a day  enoxaparin Injectable 40 milliGRAM(s) SubCutaneous every 24 hours  labetalol 100 milliGRAM(s) Oral two times a day  levalbuterol Inhalation 0.63 milliGRAM(s) Inhalation every 6 hours  predniSONE   Tablet 40 milliGRAM(s) Oral daily  tamsulosin 0.4 milliGRAM(s) Oral at bedtime    MEDICATIONS  (PRN):  acetaminophen     Tablet .. 650 milliGRAM(s) Oral every 6 hours PRN Temp greater or equal to 38C (100.4F), Mild Pain (1 - 3)  ALBUTerol    90 MICROgram(s) HFA Inhaler 2 Puff(s) Inhalation every 6 hours PRN Shortness of Breath and/or Wheezing  benzonatate 100 milliGRAM(s) Oral every 8 hours PRN Cough  guaiFENesin Oral Liquid (Sugar-Free) 200 milliGRAM(s) Oral every 6 hours PRN Cough  melatonin 3 milliGRAM(s) Oral at bedtime PRN Insomnia  ondansetron Injectable 4 milliGRAM(s) IV Push every 8 hours PRN Nausea and/or Vomiting    OBJECTIVE:  Vital Signs Last 24 Hrs  T(C): 36.7 (15 May 2022 06:05), Max: 37.1 (14 May 2022 17:20)  T(F): 98 (15 May 2022 06:05), Max: 98.7 (14 May 2022 17:20)  HR: 84 (15 May 2022 06:05) (78 - 98)  BP: 151/80 (15 May 2022 06:05) (129/69 - 151/80)  BP(mean): --  RR: 18 (15 May 2022 06:05) (18 - 19)  SpO2: 97% (15 May 2022 06:05) (96% - 99%)    PHYSICAL EXAM:  GENERAL: NAD, well-developed.   HEAD:  Atraumatic, Normocephalic  EYES: conjunctiva and sclera clear  NECK: Supple, No JVD  CHEST/LUNG: wheezing improved. No labored breathing. No crackles or rhonchi.  HEART: Regular rate and rhythm; No murmurs, rubs, or gallops  ABDOMEN: Soft, Nontender, Nondistended; Bowel sounds present  EXTREMITIES:  No clubbing, cyanosis, or edema  PSYCH: AAOx3  NEUROLOGY: non-focal  SKIN: No rashes or lesions      CAPILLARY BLOOD GLUCOSE        I&O's Summary    14 May 2022 07:01  -  15 May 2022 07:00  --------------------------------------------------------  IN: 1290 mL / OUT: 1600 mL / NET: -310 mL              LABS:                        14.0   14.36 )-----------( 221      ( 15 May 2022 05:20 )             39.0     05-14    137  |  101  |  19  ----------------------------<  108<H>  3.8   |  25  |  1.14    Ca    8.8      14 May 2022 06:27  Phos  3.6     05-14  Mg     2.10     05-14            Urinalysis Basic - ( 14 May 2022 03:50 )    Color: Light Yellow / Appearance: Clear / S.017 / pH: x  Gluc: x / Ketone: Negative  / Bili: Negative / Urobili: <2 mg/dL   Blood: x / Protein: 30 mg/dL / Nitrite: Negative   Leuk Esterase: Negative / RBC: 3 /HPF / WBC 21 /HPF   Sq Epi: x / Non Sq Epi: 0 /HPF / Bacteria: Occasional            RADIOLOGY & ADDITIONAL TESTS:

## 2022-05-15 NOTE — PROGRESS NOTE ADULT - ATTENDING COMMENTS
71M history of asthma, with one prior hospitalization many years ago, not on any medications, admitted for asthma exacerbation. Pt s/p IV solumedrol and nebs with improvement. Currently comfortable, not in respiratory distress, no wheezing appreciated today. Heart sounds normal, rest of exam benign.   Continue with PO steroids. Switch to Xopenex given tachycardia. Tachycardia improving, sinus on EKG. Course c/b HTN urgency, improved on labetalol. Will continue. BP outside of goal range so will increase labetalol. Encourage pt and daughter to check BP at home. Pt will need to establish outpt followup for primary care, discussed with pt at bedside. Continue with low ICS+ formoterol inhaler with a RENEE prn given pt will be unable to f/u outpt for at least a month due to insurance issues.   Leukocytosis noted, no localizing signs of infection on exam. May be 2/2 to steroids.  Discussed with pt and daughter at bedside
71M history of asthma, with one prior hospitalization many years ago, not on any medications, admitted for asthma exacerbation. Pt s/p IV solumedrol and nebs with improvement. Currently comfortable, not in respiratory distress, faint exp wheeze still appreciated. Heart sounds normal, mildly tachycardic, rest of exam benign.   Continue with PO steroids. Switch to Xopenex given tachycardia. Tachycardia improving, sinus on EKG. Course c/b HTN urgency, improved on labetalol. Will continue. Pt will need to establish outpt followup for primary care, discussed with pt at bedside. Continue with low ICS+ formoterol inhaler with a RENEE prn given pt will be unable to f/u outpt for at least a month due to insurance issues.   Leukocytosis noted, no localizing signs of infection on exam. F/u diff. May be 2/2 to steroids. F/u repeat CBC tomorrow
71M history of asthma, with one prior hospitalization many years ago, not on any medications, admitted for asthma exacerbation. Pt s/p IV solumedrol and nebs with improvement. Currently comfortable, not in respiratory distress, faint exp wheeze appreciated. Heart sounds normal, mildly tachycardic, rest of exam benign. Given improvement, will transition to PO steroids. Switch to Xopenex given tachycardia. Tachycardia improving, sinus on EKG. Course c/b HTN urgency, improved on labetalol. Will continue. Pt will need to establish outpt followup for primary care, discussed with pt at bedside. Will start on low ICS+ formoterol inhaler with a RENEE prn given pt will be unable to f/u outpt for at least a month due to insurance issues.

## 2022-05-15 NOTE — PROGRESS NOTE ADULT - PROBLEM SELECTOR PLAN 1
Pt with known history of Asthma- not on home treatment  - Transitioned off IV Solumedrol to Prednisone 40 mg.  - Switched from albuterol to Xopenex d/t tachycardia   - Check peak flow daily.  - Robitussin and Tessalon for chest congestion.   - Daily FSGs and ISS while on steroids  - needs symbicort as maintenance medication

## 2022-12-15 ENCOUNTER — INPATIENT (INPATIENT)
Facility: HOSPITAL | Age: 72
LOS: 2 days | Discharge: HOME | End: 2022-12-18
Attending: HOSPITALIST | Admitting: HOSPITALIST

## 2022-12-15 VITALS
HEART RATE: 101 BPM | OXYGEN SATURATION: 100 % | SYSTOLIC BLOOD PRESSURE: 119 MMHG | DIASTOLIC BLOOD PRESSURE: 63 MMHG | RESPIRATION RATE: 30 BRPM

## 2022-12-15 DIAGNOSIS — J10.1 INFLUENZA DUE TO OTHER IDENTIFIED INFLUENZA VIRUS WITH OTHER RESPIRATORY MANIFESTATIONS: ICD-10-CM

## 2022-12-15 DIAGNOSIS — Z98.890 OTHER SPECIFIED POSTPROCEDURAL STATES: Chronic | ICD-10-CM

## 2022-12-15 DIAGNOSIS — N40.0 BENIGN PROSTATIC HYPERPLASIA WITHOUT LOWER URINARY TRACT SYMPTOMS: ICD-10-CM

## 2022-12-15 DIAGNOSIS — Z29.9 ENCOUNTER FOR PROPHYLACTIC MEASURES, UNSPECIFIED: ICD-10-CM

## 2022-12-15 DIAGNOSIS — J45.901 UNSPECIFIED ASTHMA WITH (ACUTE) EXACERBATION: ICD-10-CM

## 2022-12-15 DIAGNOSIS — E78.5 HYPERLIPIDEMIA, UNSPECIFIED: ICD-10-CM

## 2022-12-15 PROBLEM — I10 ESSENTIAL (PRIMARY) HYPERTENSION: Chronic | Status: ACTIVE | Noted: 2022-05-13

## 2022-12-15 PROBLEM — J45.909 UNSPECIFIED ASTHMA, UNCOMPLICATED: Chronic | Status: ACTIVE | Noted: 2022-05-13

## 2022-12-15 LAB
ALBUMIN SERPL ELPH-MCNC: 4.2 G/DL — SIGNIFICANT CHANGE UP (ref 3.3–5)
ALP SERPL-CCNC: 78 U/L — SIGNIFICANT CHANGE UP (ref 40–120)
ALT FLD-CCNC: 17 U/L — SIGNIFICANT CHANGE UP (ref 4–41)
ANION GAP SERPL CALC-SCNC: 11 MMOL/L — SIGNIFICANT CHANGE UP (ref 7–14)
APPEARANCE UR: CLEAR — SIGNIFICANT CHANGE UP
APTT BLD: 27.9 SEC — SIGNIFICANT CHANGE UP (ref 27–36.3)
AST SERPL-CCNC: 16 U/L — SIGNIFICANT CHANGE UP (ref 4–40)
B PERT DNA SPEC QL NAA+PROBE: SIGNIFICANT CHANGE UP
B PERT+PARAPERT DNA PNL SPEC NAA+PROBE: SIGNIFICANT CHANGE UP
BASE EXCESS BLDV CALC-SCNC: -2.7 MMOL/L — LOW (ref -2–3)
BASE EXCESS BLDV CALC-SCNC: 1 MMOL/L — SIGNIFICANT CHANGE UP (ref -2–3)
BASOPHILS # BLD AUTO: 0.03 K/UL — SIGNIFICANT CHANGE UP (ref 0–0.2)
BASOPHILS NFR BLD AUTO: 0.2 % — SIGNIFICANT CHANGE UP (ref 0–2)
BILIRUB SERPL-MCNC: 1.2 MG/DL — SIGNIFICANT CHANGE UP (ref 0.2–1.2)
BILIRUB UR-MCNC: NEGATIVE — SIGNIFICANT CHANGE UP
BLOOD GAS VENOUS COMPREHENSIVE RESULT: SIGNIFICANT CHANGE UP
BLOOD GAS VENOUS COMPREHENSIVE RESULT: SIGNIFICANT CHANGE UP
BORDETELLA PARAPERTUSSIS (RAPRVP): SIGNIFICANT CHANGE UP
BUN SERPL-MCNC: 8 MG/DL — SIGNIFICANT CHANGE UP (ref 7–23)
C PNEUM DNA SPEC QL NAA+PROBE: SIGNIFICANT CHANGE UP
CALCIUM SERPL-MCNC: 9.1 MG/DL — SIGNIFICANT CHANGE UP (ref 8.4–10.5)
CHLORIDE BLDV-SCNC: 102 MMOL/L — SIGNIFICANT CHANGE UP (ref 96–108)
CHLORIDE BLDV-SCNC: 104 MMOL/L — SIGNIFICANT CHANGE UP (ref 96–108)
CHLORIDE SERPL-SCNC: 99 MMOL/L — SIGNIFICANT CHANGE UP (ref 98–107)
CO2 BLDV-SCNC: 22.9 MMOL/L — SIGNIFICANT CHANGE UP (ref 22–26)
CO2 BLDV-SCNC: 27.3 MMOL/L — HIGH (ref 22–26)
CO2 SERPL-SCNC: 25 MMOL/L — SIGNIFICANT CHANGE UP (ref 22–31)
COLOR SPEC: YELLOW — SIGNIFICANT CHANGE UP
CREAT SERPL-MCNC: 1.15 MG/DL — SIGNIFICANT CHANGE UP (ref 0.5–1.3)
DIFF PNL FLD: NEGATIVE — SIGNIFICANT CHANGE UP
EGFR: 67 ML/MIN/1.73M2 — SIGNIFICANT CHANGE UP
EOSINOPHIL # BLD AUTO: 0 K/UL — SIGNIFICANT CHANGE UP (ref 0–0.5)
EOSINOPHIL NFR BLD AUTO: 0 % — SIGNIFICANT CHANGE UP (ref 0–6)
FLUAV H1 2009 PAND RNA SPEC QL NAA+PROBE: DETECTED
FLUBV RNA SPEC QL NAA+PROBE: SIGNIFICANT CHANGE UP
GAS PNL BLDV: 132 MMOL/L — LOW (ref 136–145)
GAS PNL BLDV: 133 MMOL/L — LOW (ref 136–145)
GLUCOSE BLDV-MCNC: 151 MG/DL — HIGH (ref 70–99)
GLUCOSE BLDV-MCNC: 164 MG/DL — HIGH (ref 70–99)
GLUCOSE SERPL-MCNC: 149 MG/DL — HIGH (ref 70–99)
GLUCOSE UR QL: NEGATIVE — SIGNIFICANT CHANGE UP
HADV DNA SPEC QL NAA+PROBE: SIGNIFICANT CHANGE UP
HCO3 BLDV-SCNC: 22 MMOL/L — SIGNIFICANT CHANGE UP (ref 22–29)
HCO3 BLDV-SCNC: 26 MMOL/L — SIGNIFICANT CHANGE UP (ref 22–29)
HCOV 229E RNA SPEC QL NAA+PROBE: SIGNIFICANT CHANGE UP
HCOV HKU1 RNA SPEC QL NAA+PROBE: SIGNIFICANT CHANGE UP
HCOV NL63 RNA SPEC QL NAA+PROBE: SIGNIFICANT CHANGE UP
HCOV OC43 RNA SPEC QL NAA+PROBE: SIGNIFICANT CHANGE UP
HCT VFR BLD CALC: 39.9 % — SIGNIFICANT CHANGE UP (ref 39–50)
HCT VFR BLDA CALC: 41 % — SIGNIFICANT CHANGE UP (ref 39–51)
HCT VFR BLDA CALC: 44 % — SIGNIFICANT CHANGE UP (ref 39–51)
HGB BLD CALC-MCNC: 13.5 G/DL — SIGNIFICANT CHANGE UP (ref 13–17)
HGB BLD CALC-MCNC: 14.7 G/DL — SIGNIFICANT CHANGE UP (ref 13–17)
HGB BLD-MCNC: 14.7 G/DL — SIGNIFICANT CHANGE UP (ref 13–17)
HMPV RNA SPEC QL NAA+PROBE: SIGNIFICANT CHANGE UP
HPIV1 RNA SPEC QL NAA+PROBE: SIGNIFICANT CHANGE UP
HPIV2 RNA SPEC QL NAA+PROBE: SIGNIFICANT CHANGE UP
HPIV3 RNA SPEC QL NAA+PROBE: SIGNIFICANT CHANGE UP
HPIV4 RNA SPEC QL NAA+PROBE: SIGNIFICANT CHANGE UP
IANC: 10.63 K/UL — HIGH (ref 1.8–7.4)
IMM GRANULOCYTES NFR BLD AUTO: 0.5 % — SIGNIFICANT CHANGE UP (ref 0–0.9)
INR BLD: 1.24 RATIO — HIGH (ref 0.88–1.16)
KETONES UR-MCNC: NEGATIVE — SIGNIFICANT CHANGE UP
LACTATE BLDV-MCNC: 2.6 MMOL/L — HIGH (ref 0.5–2)
LACTATE BLDV-MCNC: 3.3 MMOL/L — HIGH (ref 0.5–2)
LEUKOCYTE ESTERASE UR-ACNC: NEGATIVE — SIGNIFICANT CHANGE UP
LYMPHOCYTES # BLD AUTO: 1.25 K/UL — SIGNIFICANT CHANGE UP (ref 1–3.3)
LYMPHOCYTES # BLD AUTO: 9.7 % — LOW (ref 13–44)
M PNEUMO DNA SPEC QL NAA+PROBE: SIGNIFICANT CHANGE UP
MCHC RBC-ENTMCNC: 30.5 PG — SIGNIFICANT CHANGE UP (ref 27–34)
MCHC RBC-ENTMCNC: 36.8 GM/DL — HIGH (ref 32–36)
MCV RBC AUTO: 82.8 FL — SIGNIFICANT CHANGE UP (ref 80–100)
MONOCYTES # BLD AUTO: 0.9 K/UL — SIGNIFICANT CHANGE UP (ref 0–0.9)
MONOCYTES NFR BLD AUTO: 7 % — SIGNIFICANT CHANGE UP (ref 2–14)
NEUTROPHILS # BLD AUTO: 10.63 K/UL — HIGH (ref 1.8–7.4)
NEUTROPHILS NFR BLD AUTO: 82.6 % — HIGH (ref 43–77)
NITRITE UR-MCNC: NEGATIVE — SIGNIFICANT CHANGE UP
NRBC # BLD: 0 /100 WBCS — SIGNIFICANT CHANGE UP (ref 0–0)
NRBC # FLD: 0 K/UL — SIGNIFICANT CHANGE UP (ref 0–0)
PCO2 BLDV: 36 MMHG — LOW (ref 42–55)
PCO2 BLDV: 42 MMHG — SIGNIFICANT CHANGE UP (ref 42–55)
PH BLDV: 7.39 — SIGNIFICANT CHANGE UP (ref 7.32–7.43)
PH BLDV: 7.4 — SIGNIFICANT CHANGE UP (ref 7.32–7.43)
PH UR: 7.5 — SIGNIFICANT CHANGE UP (ref 5–8)
PLATELET # BLD AUTO: 226 K/UL — SIGNIFICANT CHANGE UP (ref 150–400)
PO2 BLDV: 31 MMHG — SIGNIFICANT CHANGE UP
PO2 BLDV: 37 MMHG — SIGNIFICANT CHANGE UP
POTASSIUM BLDV-SCNC: 3.8 MMOL/L — SIGNIFICANT CHANGE UP (ref 3.5–5.1)
POTASSIUM BLDV-SCNC: 4.6 MMOL/L — SIGNIFICANT CHANGE UP (ref 3.5–5.1)
POTASSIUM SERPL-MCNC: 4.5 MMOL/L — SIGNIFICANT CHANGE UP (ref 3.5–5.3)
POTASSIUM SERPL-SCNC: 4.5 MMOL/L — SIGNIFICANT CHANGE UP (ref 3.5–5.3)
PROT SERPL-MCNC: 7.6 G/DL — SIGNIFICANT CHANGE UP (ref 6–8.3)
PROT UR-MCNC: NEGATIVE — SIGNIFICANT CHANGE UP
PROTHROM AB SERPL-ACNC: 14.4 SEC — HIGH (ref 10.5–13.4)
RAPID RVP RESULT: DETECTED
RBC # BLD: 4.82 M/UL — SIGNIFICANT CHANGE UP (ref 4.2–5.8)
RBC # FLD: 13.4 % — SIGNIFICANT CHANGE UP (ref 10.3–14.5)
RSV RNA SPEC QL NAA+PROBE: SIGNIFICANT CHANGE UP
RV+EV RNA SPEC QL NAA+PROBE: SIGNIFICANT CHANGE UP
SAO2 % BLDV: 45 % — SIGNIFICANT CHANGE UP
SAO2 % BLDV: 57.5 % — SIGNIFICANT CHANGE UP
SARS-COV-2 RNA SPEC QL NAA+PROBE: SIGNIFICANT CHANGE UP
SODIUM SERPL-SCNC: 135 MMOL/L — SIGNIFICANT CHANGE UP (ref 135–145)
SP GR SPEC: 1.02 — SIGNIFICANT CHANGE UP (ref 1.01–1.05)
TROPONIN T, HIGH SENSITIVITY RESULT: <6 NG/L — SIGNIFICANT CHANGE UP
UROBILINOGEN FLD QL: SIGNIFICANT CHANGE UP
WBC # BLD: 12.87 K/UL — HIGH (ref 3.8–10.5)
WBC # FLD AUTO: 12.87 K/UL — HIGH (ref 3.8–10.5)

## 2022-12-15 PROCEDURE — 71250 CT THORAX DX C-: CPT | Mod: 26

## 2022-12-15 PROCEDURE — 99223 1ST HOSP IP/OBS HIGH 75: CPT

## 2022-12-15 PROCEDURE — 93010 ELECTROCARDIOGRAM REPORT: CPT

## 2022-12-15 PROCEDURE — 99285 EMERGENCY DEPT VISIT HI MDM: CPT

## 2022-12-15 PROCEDURE — 71045 X-RAY EXAM CHEST 1 VIEW: CPT | Mod: 26

## 2022-12-15 RX ORDER — ACETAMINOPHEN 500 MG
650 TABLET ORAL ONCE
Refills: 0 | Status: DISCONTINUED | OUTPATIENT
Start: 2022-12-15 | End: 2022-12-15

## 2022-12-15 RX ORDER — CEFTRIAXONE 500 MG/1
1000 INJECTION, POWDER, FOR SOLUTION INTRAMUSCULAR; INTRAVENOUS ONCE
Refills: 0 | Status: COMPLETED | OUTPATIENT
Start: 2022-12-15 | End: 2022-12-15

## 2022-12-15 RX ORDER — IPRATROPIUM/ALBUTEROL SULFATE 18-103MCG
3 AEROSOL WITH ADAPTER (GRAM) INHALATION EVERY 6 HOURS
Refills: 0 | Status: DISCONTINUED | OUTPATIENT
Start: 2022-12-15 | End: 2022-12-18

## 2022-12-15 RX ORDER — ACETAMINOPHEN 500 MG
650 TABLET ORAL EVERY 6 HOURS
Refills: 0 | Status: DISCONTINUED | OUTPATIENT
Start: 2022-12-15 | End: 2022-12-18

## 2022-12-15 RX ORDER — HEPARIN SODIUM 5000 [USP'U]/ML
5000 INJECTION INTRAVENOUS; SUBCUTANEOUS EVERY 8 HOURS
Refills: 0 | Status: DISCONTINUED | OUTPATIENT
Start: 2022-12-15 | End: 2022-12-18

## 2022-12-15 RX ORDER — MAGNESIUM SULFATE 500 MG/ML
2 VIAL (ML) INJECTION ONCE
Refills: 0 | Status: COMPLETED | OUTPATIENT
Start: 2022-12-15 | End: 2022-12-15

## 2022-12-15 RX ORDER — ATORVASTATIN CALCIUM 80 MG/1
1 TABLET, FILM COATED ORAL
Qty: 0 | Refills: 0 | DISCHARGE

## 2022-12-15 RX ORDER — IPRATROPIUM/ALBUTEROL SULFATE 18-103MCG
3 AEROSOL WITH ADAPTER (GRAM) INHALATION ONCE
Refills: 0 | Status: COMPLETED | OUTPATIENT
Start: 2022-12-15 | End: 2022-12-15

## 2022-12-15 RX ORDER — ATORVASTATIN CALCIUM 80 MG/1
20 TABLET, FILM COATED ORAL AT BEDTIME
Refills: 0 | Status: DISCONTINUED | OUTPATIENT
Start: 2022-12-15 | End: 2022-12-18

## 2022-12-15 RX ORDER — SODIUM CHLORIDE 9 MG/ML
1000 INJECTION INTRAMUSCULAR; INTRAVENOUS; SUBCUTANEOUS ONCE
Refills: 0 | Status: COMPLETED | OUTPATIENT
Start: 2022-12-15 | End: 2022-12-15

## 2022-12-15 RX ORDER — AZITHROMYCIN 500 MG/1
500 TABLET, FILM COATED ORAL ONCE
Refills: 0 | Status: COMPLETED | OUTPATIENT
Start: 2022-12-15 | End: 2022-12-15

## 2022-12-15 RX ORDER — TAMSULOSIN HYDROCHLORIDE 0.4 MG/1
0.4 CAPSULE ORAL AT BEDTIME
Refills: 0 | Status: DISCONTINUED | OUTPATIENT
Start: 2022-12-15 | End: 2022-12-18

## 2022-12-15 RX ORDER — ALBUTEROL 90 UG/1
2 AEROSOL, METERED ORAL EVERY 6 HOURS
Refills: 0 | Status: DISCONTINUED | OUTPATIENT
Start: 2022-12-15 | End: 2022-12-18

## 2022-12-15 RX ORDER — ACETAMINOPHEN 500 MG
1000 TABLET ORAL ONCE
Refills: 0 | Status: COMPLETED | OUTPATIENT
Start: 2022-12-15 | End: 2022-12-15

## 2022-12-15 RX ADMIN — ATORVASTATIN CALCIUM 20 MILLIGRAM(S): 80 TABLET, FILM COATED ORAL at 21:52

## 2022-12-15 RX ADMIN — AZITHROMYCIN 255 MILLIGRAM(S): 500 TABLET, FILM COATED ORAL at 16:30

## 2022-12-15 RX ADMIN — SODIUM CHLORIDE 1000 MILLILITER(S): 9 INJECTION INTRAMUSCULAR; INTRAVENOUS; SUBCUTANEOUS at 13:24

## 2022-12-15 RX ADMIN — CEFTRIAXONE 100 MILLIGRAM(S): 500 INJECTION, POWDER, FOR SOLUTION INTRAMUSCULAR; INTRAVENOUS at 15:44

## 2022-12-15 RX ADMIN — Medication 75 MILLIGRAM(S): at 21:58

## 2022-12-15 RX ADMIN — Medication 50 MILLIGRAM(S): at 14:52

## 2022-12-15 RX ADMIN — HEPARIN SODIUM 5000 UNIT(S): 5000 INJECTION INTRAVENOUS; SUBCUTANEOUS at 21:51

## 2022-12-15 RX ADMIN — Medication 3 MILLILITER(S): at 13:10

## 2022-12-15 RX ADMIN — Medication 650 MILLIGRAM(S): at 21:52

## 2022-12-15 RX ADMIN — Medication 3 MILLILITER(S): at 14:00

## 2022-12-15 RX ADMIN — Medication 3 MILLILITER(S): at 13:24

## 2022-12-15 RX ADMIN — TAMSULOSIN HYDROCHLORIDE 0.4 MILLIGRAM(S): 0.4 CAPSULE ORAL at 21:56

## 2022-12-15 RX ADMIN — Medication 400 MILLIGRAM(S): at 13:24

## 2022-12-15 RX ADMIN — Medication 150 GRAM(S): at 14:53

## 2022-12-15 RX ADMIN — Medication 3 MILLILITER(S): at 21:51

## 2022-12-15 NOTE — H&P ADULT - PROBLEM SELECTOR PLAN 2
Influenza A +   symptoms started more than 48hrs ago, however given older age and hx of asthma -Tamiflu x 5 days   supportive care   Pt given Rocephin and azithromycin in Ed- however afebrile, wbc may be reactive   follow up with CT chest if concerning for PNA- restart abx in am , follow up with procal

## 2022-12-15 NOTE — ED PROVIDER NOTE - CARE PLAN
Principal Discharge DX:	Acute asthma exacerbation  Secondary Diagnosis:	Influenza with respiratory symptoms   1

## 2022-12-15 NOTE — H&P ADULT - ASSESSMENT
72 year old male with hx of HLD, Asthma and BPH presenting for acute respiratory distress. Patient Flu +, admitted for acute respiratory distress 2/2 asthma exacerbation 2/2 Influenza.

## 2022-12-15 NOTE — ED PROVIDER NOTE - OBJECTIVE STATEMENT
72M pmh HTN, asthma, BPH presents in acute respiratory distress. 72M pmh HTN, HLD, asthma, BPH presents in acute respiratory distress.  Daughter at bedside helps provide translation services and additional history due to respiratory distress, which patient was able to be more participatory with after initial nebulizer treatment initiated.  He has been having approximately 1 week of cough and URI symptoms.  Past ~1 day has been having shortness of breath, with chest "burning" when he coughs, feels like he can't catch his breath due to coughing.  Is not taking his albuterol for this.  On ride over he had lightheadedness, and an episode of vomiting.  Took tylenol and a cough suppressant prior to arrival.    He describes his asthma as usually seasonal, typically well controlled not requiring a daily inhaler or frequent albuterol use.

## 2022-12-15 NOTE — H&P ADULT - NSHPREVIEWOFSYSTEMS_GEN_ALL_CORE
REVIEW OF SYSTEMS:    CONSTITUTIONAL:  + weakness, No fevers or chills  EYES/ENT:  No visual changes;  No vertigo or throat pain   NECK:  No pain or stiffness  RESPIRATORY+ cough and  wheezing and shortness of breath. NO hemoptysis  CARDIOVASCULAR:  No chest pain or palpitations  GASTROINTESTINAL:  No abdominal or epigastric pain. No nausea, vomiting, or hematemesis; No diarrhea or constipation. No melena or hematochezia.  GENITOURINARY:  No dysuria, frequency or hematuria  MUSCULOSKELETAL:  FROM all extremities, normal strength, No calf tenderness  NEUROLOGICAL:  No numbness or weakness  SKIN:  No itching, rashes

## 2022-12-15 NOTE — ED PROVIDER NOTE - CROS ED RESP ALL NEG
"Anesthesia Release from PACU Note    Patient: Sung Buckley    Procedure(s) Performed: Procedure(s) (LRB):  ARTHROPLASTY-HIP-BURT (Right)    Anesthesia type: general    Post pain: Adequate analgesia    Post assessment: no apparent anesthetic complications    Last Vitals:   Visit Vitals  /66   Pulse 105   Temp (!) 38.2 °C (100.8 °F) (Oral)   Resp 13   Ht 5' 11" (1.803 m)   Wt 89.8 kg (198 lb)   SpO2 95%   BMI 27.62 kg/m²       Post vital signs: stable    Level of consciousness: awake    Nausea/Vomiting: no nausea/no vomiting    Complications: none    Airway Patency: patent    Respiratory: unassisted, spontaneous ventilation, face mask    Cardiovascular: stable and blood pressure at baseline    Hydration: euvolemic  "
- - -

## 2022-12-15 NOTE — ED ADULT NURSE NOTE - NSIMPLEMENTINTERV_GEN_ALL_ED
Implemented All Universal Safety Interventions:  [place] to call system. Call bell, personal items and telephone within reach. Instruct patient to call for assistance. Room bathroom lighting operational. Non-slip footwear when patient is off stretcher. Physically safe environment: no spills, clutter or unnecessary equipment. Stretcher in lowest position, wheels locked, appropriate side rails in place.

## 2022-12-15 NOTE — ED PROVIDER NOTE - NSICDXPASTMEDICALHX_GEN_ALL_CORE_FT
PAST MEDICAL HISTORY:  Asthma     BPH (benign prostatic hyperplasia)     BPH (benign prostatic hypertrophy)     Essential hypertension     GERD (gastroesophageal reflux disease)     High cholesterol     HTN (hypertension)

## 2022-12-15 NOTE — ED ADULT TRIAGE NOTE - CHIEF COMPLAINT QUOTE
cough, weakness and chest pain, pt poor historian, appears tachypneic, and diaphoretic, clenching chest, waiting for daughter for further collateral, unable to get oral temperature

## 2022-12-15 NOTE — PATIENT PROFILE ADULT - FALL HARM RISK - HARM RISK INTERVENTIONS

## 2022-12-15 NOTE — H&P ADULT - NSHPLABSRESULTS_GEN_ALL_CORE
LABS:                          14.7   12.87 )-----------( 226      ( 15 Dec 2022 13:00 )             39.9     12-15    135  |  99  |  8   ----------------------------<  149<H>  4.5   |  25  |  1.15    Ca    9.1      15 Dec 2022 13:00    TPro  7.6  /  Alb  4.2  /  TBili  1.2  /  DBili  x   /  AST  16  /  ALT  17  /  AlkPhos  78  12-15    PT/INR - ( 15 Dec 2022 13:00 )   PT: 14.4 sec;   INR: 1.24 ratio         PTT - ( 15 Dec 2022 13:00 )  PTT:27.9 sec

## 2022-12-15 NOTE — H&P ADULT - NSHPPHYSICALEXAM_GEN_ALL_CORE
VITAL SIGNS:  T(F): 98.6 (12-15-22 @ 14:07), Max: 98.6 (12-15-22 @ 14:07)  HR: 110 (12-15-22 @ 15:46) (101 - 110)  BP: 176/81 (12-15-22 @ 15:46) (104/80 - 176/81)  BP(mean): --  RR: 20 (12-15-22 @ 15:46) (20 - 30)  SpO2: 100% (12-15-22 @ 15:46) (100% - 100%)    PHYSICAL EXAM:    Constitutional:  resting comfortably in bed; in mild discomfort   HEENT: NC/AT, PERRL, EOMI, anicteric sclera, no nasal discharge; uvula midline, no oropharyngeal erythema or exudates; MMM  Neck: supple; no JVD or thyromegaly  Respiratory: CTA B/L; mild b/l rhonci , no wheezing appreciated , no increase work of breath, RR : 20-21   Cardiac: +Tachcyardiac, no M/R/G; PMI non-displaced  Gastrointestinal: soft, NT/ND; no rebound or guarding; +BSx4  Extremities: WWP, no clubbing or cyanosis; no peripheral edema  Musculoskeletal: NROM x4; no joint swelling, tenderness or erythema  Dermatologic: skin warm, dry and intact; no rashes, wounds, or scars  Neurologic: AAOx3; CNII-XII grossly intact; no focal deficits  Psychiatric: affect and characteristics of appearance, verbalizations, behaviors are appropriate, denies SI/HI/AH/VH

## 2022-12-15 NOTE — ED PROVIDER NOTE - CLINICAL SUMMARY MEDICAL DECISION MAKING FREE TEXT BOX
72M pmh HTN, BPH, HLD, asthma who presents in acute respiratory failure. The differential diagnosis includes but is not limited to asthma exacerbation likely 2/2 acute viral illness, possible PNA.  Will give nebs x3 and reassess need for bipap, get CXR, labs, RVP

## 2022-12-15 NOTE — ED ADULT NURSE NOTE - OBJECTIVE STATEMENT
Pt arrives directly to [facility]: Pts daughter brought pt to ED for SOB, altered mental status, and c/o nausea en route to ED, PMH of HTN, BPH, asthma. Pt currently A&Ox3, drowsy, and c/o persistent cough. Respirations are even and unlabored, sating at 95% on RA, sinus tachycardia on monitor, febrile rectally 100.1. 20 G to L and R AC placed and medicated as ordered.

## 2022-12-15 NOTE — ED PROVIDER NOTE - ATTENDING CONTRIBUTION TO CARE
73 yo M hx asthma, HTN, HLD, BPH, brought in by daughter in acute respiratory distress x 1 day. Pt with noted cough, sob, chest pain x 1 day. On arrival, pt tachypneic to 30s, taking shallow breaths, coughing when attempting to take deep breaths. Given duonebs and Mag with improvement in sx. EKG sinus tach, no SAQIB. Plan for labs, xr, tba

## 2022-12-15 NOTE — H&P ADULT - HISTORY OF PRESENT ILLNESS
72 year old male with hx of HLD, Asthma and BPH presenting for acute respiratory distress. Daugther at bedside, as per daughter patient looks much better now, patient was having shortness of breath, unable to speak properly and weakness. Pt's cough and URI symptoms started on Monday, however became worse last night. Past ~1 day has been having shortness of breath, with chest "burning" when he coughs, feels like he can't catch his breath due to coughing.  Is not taking his albuterol for this.  On ride over he had lightheadedness, and an episode of vomiting.  Took tylenol and a cough suppressant prior to arrival. As per patient and daugther, usually patient's asthma is well controlled, however had an exacerbation in May 2022 - has not needed inhaler since then. As per daughter patient's wife is also admitted to hospital, multiple flu positive family members. Pt did not receive flu vaccine this year.     ED: T: 98.6 I HR: 110 : BP: 119/63 I RR: 30 initially improved to 20 after neb treatment   wbc: 12.87 , Influenza A +   XRAY:   s/p Rocephin , Azithromycin, Duoneb , Prednisone 50mg ONCE, Mg , Tylenol

## 2022-12-15 NOTE — ED ADULT NURSE REASSESSMENT NOTE - NS ED NURSE REASSESS COMMENT FT1
Report given to Cony Delgado rm [id number]. pt a&ox4, resting comfortably in stretcher. Denies cp, SOB, n/v, n/t, fever/ chills. Breathing even, unlabored. Vitals are stable. Pt medicated as per MAR. Pending transport.
Report received from day RNCarol. Pt resting comfortably in stretcher, a&ox4, creole speaking, wife & daughter at bedside for translation. Breathing even, unlabored. Pt denies cp, SOB, n/v, n/t, fever/chills. Vitals are stable. Pt admitted to medicine, pending bed assignment. Fall precautions in place.

## 2022-12-15 NOTE — ED PROVIDER NOTE - PHYSICAL EXAMINATION
Vital signs reviewed.  CONSTITUTIONAL: in acute respiratory distress  HEAD: Normocephalic; atraumatic  NECK: Trachea midline  CV: Normal S1, S2; extremities WWP  RESP: tachypneic to 30s-40s, increased work of breathing with accessory muscle use; no stridor  ABD: soft, non-distended; non-tender  MSK/EXT: no edema, no deformities  SKIN: Warm and dry as visualized  NEURO: Awake, initially not speaking due to respiratory distress - post-nebulizers speaking in 2 word sentences A&Ox3, appears non-focal

## 2022-12-15 NOTE — H&P ADULT - PROBLEM SELECTOR PLAN 1
Hx of Asthma, only uses inhaler during exacerbation's  -Duoneb q6H  -s/p prednisone 50mg In ED, continue with prednisone 50mg x 4 days   -Albuterol PRN   -Monitor O2 saturation, maintain > 95%   -Robitussin PRN for cough   -Given 2 exacerbations in 1 year, Should be discharged on daily ICS/RENEE   -Pt likely needs inhaler use education prior to discharge  -Will benefit from pulmonary outpatient   -Monitor FS while on prednisone, as per patient no hx of DM, follow up with a1c

## 2022-12-15 NOTE — ED ADULT NURSE NOTE - NS_SISCREENINGSR_GEN_ALL_ED
Hi,  Will you let the patient know results?  Ultrasound shows that the previously seen possible polyp is no longer seen which is reassuring.  The previously seen fibroids are stable in size.  No further intervention is needed.  This is a Dr. Garcia patient.  Thanks!  -Gretel Barrow MD  
Negative

## 2022-12-16 LAB
A1C WITH ESTIMATED AVERAGE GLUCOSE RESULT: 5.4 % — SIGNIFICANT CHANGE UP (ref 4–5.6)
ANION GAP SERPL CALC-SCNC: 12 MMOL/L — SIGNIFICANT CHANGE UP (ref 7–14)
BUN SERPL-MCNC: 12 MG/DL — SIGNIFICANT CHANGE UP (ref 7–23)
CALCIUM SERPL-MCNC: 8.7 MG/DL — SIGNIFICANT CHANGE UP (ref 8.4–10.5)
CHLORIDE SERPL-SCNC: 102 MMOL/L — SIGNIFICANT CHANGE UP (ref 98–107)
CHOLEST SERPL-MCNC: 122 MG/DL — SIGNIFICANT CHANGE UP
CO2 SERPL-SCNC: 23 MMOL/L — SIGNIFICANT CHANGE UP (ref 22–31)
CREAT SERPL-MCNC: 1 MG/DL — SIGNIFICANT CHANGE UP (ref 0.5–1.3)
CULTURE RESULTS: NO GROWTH — SIGNIFICANT CHANGE UP
EGFR: 79 ML/MIN/1.73M2 — SIGNIFICANT CHANGE UP
ESTIMATED AVERAGE GLUCOSE: 108 — SIGNIFICANT CHANGE UP
GLUCOSE SERPL-MCNC: 115 MG/DL — HIGH (ref 70–99)
HCT VFR BLD CALC: 36.1 % — LOW (ref 39–50)
HDLC SERPL-MCNC: 48 MG/DL — SIGNIFICANT CHANGE UP
HGB BLD-MCNC: 12.9 G/DL — LOW (ref 13–17)
LIPID PNL WITH DIRECT LDL SERPL: 65 MG/DL — SIGNIFICANT CHANGE UP
MAGNESIUM SERPL-MCNC: 2.3 MG/DL — SIGNIFICANT CHANGE UP (ref 1.6–2.6)
MCHC RBC-ENTMCNC: 29.1 PG — SIGNIFICANT CHANGE UP (ref 27–34)
MCHC RBC-ENTMCNC: 35.7 GM/DL — SIGNIFICANT CHANGE UP (ref 32–36)
MCV RBC AUTO: 81.3 FL — SIGNIFICANT CHANGE UP (ref 80–100)
NON HDL CHOLESTEROL: 74 MG/DL — SIGNIFICANT CHANGE UP
NRBC # BLD: 0 /100 WBCS — SIGNIFICANT CHANGE UP (ref 0–0)
NRBC # FLD: 0 K/UL — SIGNIFICANT CHANGE UP (ref 0–0)
PHOSPHATE SERPL-MCNC: 2.8 MG/DL — SIGNIFICANT CHANGE UP (ref 2.5–4.5)
PLATELET # BLD AUTO: 232 K/UL — SIGNIFICANT CHANGE UP (ref 150–400)
POTASSIUM SERPL-MCNC: 4.3 MMOL/L — SIGNIFICANT CHANGE UP (ref 3.5–5.3)
POTASSIUM SERPL-SCNC: 4.3 MMOL/L — SIGNIFICANT CHANGE UP (ref 3.5–5.3)
PROCALCITONIN SERPL-MCNC: 0.76 NG/ML — HIGH (ref 0.02–0.1)
RBC # BLD: 4.44 M/UL — SIGNIFICANT CHANGE UP (ref 4.2–5.8)
RBC # FLD: 13.4 % — SIGNIFICANT CHANGE UP (ref 10.3–14.5)
SODIUM SERPL-SCNC: 137 MMOL/L — SIGNIFICANT CHANGE UP (ref 135–145)
SPECIMEN SOURCE: SIGNIFICANT CHANGE UP
TRIGL SERPL-MCNC: 44 MG/DL — SIGNIFICANT CHANGE UP
WBC # BLD: 18.32 K/UL — HIGH (ref 3.8–10.5)
WBC # FLD AUTO: 18.32 K/UL — HIGH (ref 3.8–10.5)

## 2022-12-16 PROCEDURE — 99233 SBSQ HOSP IP/OBS HIGH 50: CPT

## 2022-12-16 RX ORDER — POLYETHYLENE GLYCOL 3350 17 G/17G
17 POWDER, FOR SOLUTION ORAL DAILY
Refills: 0 | Status: DISCONTINUED | OUTPATIENT
Start: 2022-12-16 | End: 2022-12-18

## 2022-12-16 RX ORDER — AZITHROMYCIN 500 MG/1
500 TABLET, FILM COATED ORAL EVERY 24 HOURS
Refills: 0 | Status: DISCONTINUED | OUTPATIENT
Start: 2022-12-17 | End: 2022-12-18

## 2022-12-16 RX ORDER — AZITHROMYCIN 500 MG/1
TABLET, FILM COATED ORAL
Refills: 0 | Status: DISCONTINUED | OUTPATIENT
Start: 2022-12-16 | End: 2022-12-18

## 2022-12-16 RX ORDER — AZITHROMYCIN 500 MG/1
500 TABLET, FILM COATED ORAL ONCE
Refills: 0 | Status: COMPLETED | OUTPATIENT
Start: 2022-12-16 | End: 2022-12-16

## 2022-12-16 RX ORDER — CEFTRIAXONE 500 MG/1
1000 INJECTION, POWDER, FOR SOLUTION INTRAMUSCULAR; INTRAVENOUS EVERY 24 HOURS
Refills: 0 | Status: DISCONTINUED | OUTPATIENT
Start: 2022-12-16 | End: 2022-12-18

## 2022-12-16 RX ADMIN — Medication 3 MILLILITER(S): at 08:10

## 2022-12-16 RX ADMIN — HEPARIN SODIUM 5000 UNIT(S): 5000 INJECTION INTRAVENOUS; SUBCUTANEOUS at 13:07

## 2022-12-16 RX ADMIN — Medication 75 MILLIGRAM(S): at 05:00

## 2022-12-16 RX ADMIN — HEPARIN SODIUM 5000 UNIT(S): 5000 INJECTION INTRAVENOUS; SUBCUTANEOUS at 21:29

## 2022-12-16 RX ADMIN — Medication 3 MILLILITER(S): at 16:27

## 2022-12-16 RX ADMIN — ATORVASTATIN CALCIUM 20 MILLIGRAM(S): 80 TABLET, FILM COATED ORAL at 21:29

## 2022-12-16 RX ADMIN — TAMSULOSIN HYDROCHLORIDE 0.4 MILLIGRAM(S): 0.4 CAPSULE ORAL at 21:29

## 2022-12-16 RX ADMIN — Medication 75 MILLIGRAM(S): at 17:03

## 2022-12-16 RX ADMIN — CEFTRIAXONE 100 MILLIGRAM(S): 500 INJECTION, POWDER, FOR SOLUTION INTRAMUSCULAR; INTRAVENOUS at 13:07

## 2022-12-16 RX ADMIN — HEPARIN SODIUM 5000 UNIT(S): 5000 INJECTION INTRAVENOUS; SUBCUTANEOUS at 05:00

## 2022-12-16 RX ADMIN — Medication 3 MILLILITER(S): at 21:37

## 2022-12-16 RX ADMIN — AZITHROMYCIN 255 MILLIGRAM(S): 500 TABLET, FILM COATED ORAL at 16:34

## 2022-12-16 RX ADMIN — Medication 50 MILLIGRAM(S): at 05:01

## 2022-12-16 NOTE — PHYSICAL THERAPY INITIAL EVALUATION ADULT - PERTINENT HX OF CURRENT PROBLEM, REHAB EVAL
72 year old male with hx of HLD, Asthma and BPH presenting for acute respiratory distress. In ED, patient tested positive for flu A.

## 2022-12-16 NOTE — PHYSICAL THERAPY INITIAL EVALUATION ADULT - GENERAL OBSERVATIONS, REHAB EVAL
Pt found in bed; patient agreeable to PT; [place] Creole speaking but able to make needs known in English.

## 2022-12-17 LAB
ANION GAP SERPL CALC-SCNC: 12 MMOL/L — SIGNIFICANT CHANGE UP (ref 7–14)
BUN SERPL-MCNC: 16 MG/DL — SIGNIFICANT CHANGE UP (ref 7–23)
CALCIUM SERPL-MCNC: 9 MG/DL — SIGNIFICANT CHANGE UP (ref 8.4–10.5)
CHLORIDE SERPL-SCNC: 104 MMOL/L — SIGNIFICANT CHANGE UP (ref 98–107)
CO2 SERPL-SCNC: 24 MMOL/L — SIGNIFICANT CHANGE UP (ref 22–31)
CREAT SERPL-MCNC: 1.1 MG/DL — SIGNIFICANT CHANGE UP (ref 0.5–1.3)
EGFR: 71 ML/MIN/1.73M2 — SIGNIFICANT CHANGE UP
GLUCOSE SERPL-MCNC: 99 MG/DL — SIGNIFICANT CHANGE UP (ref 70–99)
HCT VFR BLD CALC: 35.7 % — LOW (ref 39–50)
HGB BLD-MCNC: 12.7 G/DL — LOW (ref 13–17)
LEGIONELLA AG UR QL: NEGATIVE — SIGNIFICANT CHANGE UP
MAGNESIUM SERPL-MCNC: 2.4 MG/DL — SIGNIFICANT CHANGE UP (ref 1.6–2.6)
MCHC RBC-ENTMCNC: 29.2 PG — SIGNIFICANT CHANGE UP (ref 27–34)
MCHC RBC-ENTMCNC: 35.6 GM/DL — SIGNIFICANT CHANGE UP (ref 32–36)
MCV RBC AUTO: 82.1 FL — SIGNIFICANT CHANGE UP (ref 80–100)
NRBC # BLD: 0 /100 WBCS — SIGNIFICANT CHANGE UP (ref 0–0)
NRBC # FLD: 0 K/UL — SIGNIFICANT CHANGE UP (ref 0–0)
PHOSPHATE SERPL-MCNC: 3.4 MG/DL — SIGNIFICANT CHANGE UP (ref 2.5–4.5)
PLATELET # BLD AUTO: 229 K/UL — SIGNIFICANT CHANGE UP (ref 150–400)
POTASSIUM SERPL-MCNC: 4.3 MMOL/L — SIGNIFICANT CHANGE UP (ref 3.5–5.3)
POTASSIUM SERPL-SCNC: 4.3 MMOL/L — SIGNIFICANT CHANGE UP (ref 3.5–5.3)
PROCALCITONIN SERPL-MCNC: 0.72 NG/ML — HIGH (ref 0.02–0.1)
RBC # BLD: 4.35 M/UL — SIGNIFICANT CHANGE UP (ref 4.2–5.8)
RBC # FLD: 13.3 % — SIGNIFICANT CHANGE UP (ref 10.3–14.5)
S PNEUM AG UR QL: NEGATIVE — SIGNIFICANT CHANGE UP
SODIUM SERPL-SCNC: 140 MMOL/L — SIGNIFICANT CHANGE UP (ref 135–145)
WBC # BLD: 16.34 K/UL — HIGH (ref 3.8–10.5)
WBC # FLD AUTO: 16.34 K/UL — HIGH (ref 3.8–10.5)

## 2022-12-17 PROCEDURE — 99232 SBSQ HOSP IP/OBS MODERATE 35: CPT

## 2022-12-17 RX ADMIN — HEPARIN SODIUM 5000 UNIT(S): 5000 INJECTION INTRAVENOUS; SUBCUTANEOUS at 05:36

## 2022-12-17 RX ADMIN — Medication 75 MILLIGRAM(S): at 05:37

## 2022-12-17 RX ADMIN — Medication 40 MILLIGRAM(S): at 05:37

## 2022-12-17 RX ADMIN — CEFTRIAXONE 100 MILLIGRAM(S): 500 INJECTION, POWDER, FOR SOLUTION INTRAMUSCULAR; INTRAVENOUS at 16:42

## 2022-12-17 RX ADMIN — Medication 75 MILLIGRAM(S): at 19:11

## 2022-12-17 RX ADMIN — HEPARIN SODIUM 5000 UNIT(S): 5000 INJECTION INTRAVENOUS; SUBCUTANEOUS at 21:08

## 2022-12-17 RX ADMIN — POLYETHYLENE GLYCOL 3350 17 GRAM(S): 17 POWDER, FOR SOLUTION ORAL at 12:50

## 2022-12-17 RX ADMIN — HEPARIN SODIUM 5000 UNIT(S): 5000 INJECTION INTRAVENOUS; SUBCUTANEOUS at 13:51

## 2022-12-17 RX ADMIN — Medication 3 MILLILITER(S): at 16:31

## 2022-12-17 RX ADMIN — AZITHROMYCIN 255 MILLIGRAM(S): 500 TABLET, FILM COATED ORAL at 17:55

## 2022-12-17 RX ADMIN — Medication 3 MILLILITER(S): at 08:35

## 2022-12-17 RX ADMIN — ATORVASTATIN CALCIUM 20 MILLIGRAM(S): 80 TABLET, FILM COATED ORAL at 21:08

## 2022-12-17 RX ADMIN — Medication 3 MILLILITER(S): at 21:07

## 2022-12-17 RX ADMIN — Medication 3 MILLILITER(S): at 05:11

## 2022-12-17 RX ADMIN — TAMSULOSIN HYDROCHLORIDE 0.4 MILLIGRAM(S): 0.4 CAPSULE ORAL at 21:07

## 2022-12-17 NOTE — DISCHARGE NOTE PROVIDER - HOSPITAL COURSE
72 year old male with hx of HLD, Asthma and BPH presenting for acute respiratory distress. Patient Flu +, admitted for acute respiratory distress 2/2 asthma exacerbation 2/2 Influenza.     Acute asthma exacerbation.   - Hx of Asthma, only uses inhaler during exacerbation's in setting of influenza infection  - c/w tamiflu, duoneb q6, dc prednisone  - CT chest Scattered groundglass opacities involving the right middle lobe and   lingula, infectious or inflammatory.   -add ceftriaxone/zithromax, check urine legionella, neg urine strep  - steriods likely contributing to leukocytosis, pt is nontoxic  -Monitor O2 saturation, maintain > 95%   -Robitussin PRN for cough   - DC home on levaquin 750mg qd to complete 5 day course.    Influenza A.   - symptoms started more than 48hrs ago, however given older age and hx of asthma -- Tamiflu x 5 days   supportive care   - Pt given Rocephin and azithromycin in Ed, cont for 5 day course, can change to levaquin 750 qd on DC  - CT chest as above, procal 0.76.    HLD (hyperlipidemia).   - continue with home atorvastatin   - A1C 5.4   - Lipid panel WNL    BPH (benign prostatic hyperplasia).   - continue with home tamsulosin.    On 12/18/22, case was discussed with , patient is medically cleared and optimized for discharge today. All medications were reviewed with attending, and sent to mutually agreed upon pharmacy.

## 2022-12-17 NOTE — DISCHARGE NOTE PROVIDER - PROVIDER TOKENS
FREE:[LAST:[Your primary care physician],PHONE:[(   )    -],FAX:[(   )    -],FOLLOWUP:[1 week]],FREE:[LAST:[Your pulmonologist],PHONE:[(   )    -],FAX:[(   )    -],FOLLOWUP:[1 week]]

## 2022-12-17 NOTE — DISCHARGE NOTE PROVIDER - NSDCQMSTAIRS_GEN_ALL_CORE
Patient's wife called and states that Debbi Guerrero needs a new glucometer . He was using [employer]. She states that his meter is no longer working. Patient's wife states that he is testing 3 x per day. Please send rx to [facility] in [place].     Thanks
No

## 2022-12-17 NOTE — DISCHARGE NOTE PROVIDER - NSDCCPCAREPLAN_GEN_ALL_CORE_FT
PRINCIPAL DISCHARGE DIAGNOSIS  Diagnosis: Acute asthma exacerbation  Assessment and Plan of Treatment: You were given antibiotics, steroids, and nebulizers during your stay in the hospital. Please continue the antibiotics as prescribed. Continue your home medications as listed.  Avoid exposures to environmental allergens such as carpets, pets and both first-hand and second-hand smoking.  During seasonal allergy period, take a shower as soon as you get home and change your clothes immediately.  Follow up your primary care physician and pulmonologist.      SECONDARY DISCHARGE DIAGNOSES  Diagnosis: Influenza with respiratory symptoms  Assessment and Plan of Treatment: You were positive for influenza. You were prescribed tamiflu for 5 days.    Diagnosis: Hyperlipidemia  Assessment and Plan of Treatment: Low fat diet, exercise daily and continue current medications. Follow up with primary care physician and cardiologist for management.    Diagnosis: History of BPH  Assessment and Plan of Treatment: Please continue your home medication tamsulosin. Follow up with your primary care physician.     PRINCIPAL DISCHARGE DIAGNOSIS  Diagnosis: Acute asthma exacerbation  Assessment and Plan of Treatment: You were given antibiotics, steroids, and nebulizers during your stay in the hospital. Please continue the antibiotics as prescribed for four more days Symbicort has been prescribed, in addition to an albuterol inhaler. . Continue your home medications as listed.  Avoid exposures to environmental allergens such as carpets, pets and both first-hand and second-hand smoking.  During seasonal allergy period, take a shower as soon as you get home and change your clothes immediately.  Follow up your primary care physician and pulmonologist.      SECONDARY DISCHARGE DIAGNOSES  Diagnosis: Influenza with respiratory symptoms  Assessment and Plan of Treatment: You were positive for influenza. You were prescribed tamiflu to complete 5 days .    Diagnosis: Hyperlipidemia  Assessment and Plan of Treatment: Low fat diet, exercise daily and continue current medications. Follow up with primary care physician and cardiologist for management.    Diagnosis: History of BPH  Assessment and Plan of Treatment: Please continue your home medication tamsulosin. Follow up with your primary care physician.

## 2022-12-17 NOTE — DISCHARGE NOTE PROVIDER - CARE PROVIDER_API CALL
Your primary care physician,   Phone: (   )    -  Fax: (   )    -  Follow Up Time: 1 week    Your pulmonologist,   Phone: (   )    -  Fax: (   )    -  Follow Up Time: 1 week

## 2022-12-17 NOTE — DISCHARGE NOTE PROVIDER - NSDCMRMEDTOKEN_GEN_ALL_CORE_FT
albuterol 90 mcg/inh inhalation aerosol: 2 puff(s) inhaled every 6 hours, As needed, Shortness of Breath and/or Wheezing  atorvastatin 20 mg oral tablet: 1 tab(s) orally once a day  guaiFENesin 100 mg/5 mL oral liquid: 10 milliliter(s) orally every 4 hours PRN cough  tamsulosin 0.4 mg oral capsule: 1 cap(s) orally once a day   albuterol 90 mcg/inh inhalation aerosol: 2 puff(s) inhaled every 6 hours, As needed, Bronchospasm  atorvastatin 20 mg oral tablet: 1 tab(s) orally once a day  budesonide-formoterol 80 mcg-4.5 mcg/inh inhalation aerosol: 1 puff(s) inhaled 2 times a day   guaiFENesin 100 mg/5 mL oral liquid: 10 milliliter(s) orally every 4 hours PRN cough  levoFLOXacin 750 mg oral tablet: 1 tab(s) orally every 24 hours   oseltamivir 75 mg oral capsule: 1 cap(s) orally 2 times a day  tamsulosin 0.4 mg oral capsule: 1 cap(s) orally once a day

## 2022-12-18 VITALS
OXYGEN SATURATION: 97 % | DIASTOLIC BLOOD PRESSURE: 89 MMHG | TEMPERATURE: 97 F | RESPIRATION RATE: 18 BRPM | SYSTOLIC BLOOD PRESSURE: 144 MMHG | HEART RATE: 115 BPM

## 2022-12-18 LAB
ANION GAP SERPL CALC-SCNC: 12 MMOL/L — SIGNIFICANT CHANGE UP (ref 7–14)
BUN SERPL-MCNC: 13 MG/DL — SIGNIFICANT CHANGE UP (ref 7–23)
CALCIUM SERPL-MCNC: 9 MG/DL — SIGNIFICANT CHANGE UP (ref 8.4–10.5)
CHLORIDE SERPL-SCNC: 103 MMOL/L — SIGNIFICANT CHANGE UP (ref 98–107)
CO2 SERPL-SCNC: 24 MMOL/L — SIGNIFICANT CHANGE UP (ref 22–31)
CREAT SERPL-MCNC: 0.99 MG/DL — SIGNIFICANT CHANGE UP (ref 0.5–1.3)
EGFR: 80 ML/MIN/1.73M2 — SIGNIFICANT CHANGE UP
GLUCOSE SERPL-MCNC: 105 MG/DL — HIGH (ref 70–99)
HCT VFR BLD CALC: 35 % — LOW (ref 39–50)
HGB BLD-MCNC: 12.7 G/DL — LOW (ref 13–17)
MAGNESIUM SERPL-MCNC: 1.9 MG/DL — SIGNIFICANT CHANGE UP (ref 1.6–2.6)
MCHC RBC-ENTMCNC: 29.5 PG — SIGNIFICANT CHANGE UP (ref 27–34)
MCHC RBC-ENTMCNC: 36.3 GM/DL — HIGH (ref 32–36)
MCV RBC AUTO: 81.2 FL — SIGNIFICANT CHANGE UP (ref 80–100)
NRBC # BLD: 0 /100 WBCS — SIGNIFICANT CHANGE UP (ref 0–0)
NRBC # FLD: 0 K/UL — SIGNIFICANT CHANGE UP (ref 0–0)
PHOSPHATE SERPL-MCNC: 4.1 MG/DL — SIGNIFICANT CHANGE UP (ref 2.5–4.5)
PLATELET # BLD AUTO: 239 K/UL — SIGNIFICANT CHANGE UP (ref 150–400)
POTASSIUM SERPL-MCNC: 3.5 MMOL/L — SIGNIFICANT CHANGE UP (ref 3.5–5.3)
POTASSIUM SERPL-SCNC: 3.5 MMOL/L — SIGNIFICANT CHANGE UP (ref 3.5–5.3)
RBC # BLD: 4.31 M/UL — SIGNIFICANT CHANGE UP (ref 4.2–5.8)
RBC # FLD: 13.5 % — SIGNIFICANT CHANGE UP (ref 10.3–14.5)
SODIUM SERPL-SCNC: 139 MMOL/L — SIGNIFICANT CHANGE UP (ref 135–145)
WBC # BLD: 14.46 K/UL — HIGH (ref 3.8–10.5)
WBC # FLD AUTO: 14.46 K/UL — HIGH (ref 3.8–10.5)

## 2022-12-18 PROCEDURE — 99239 HOSP IP/OBS DSCHRG MGMT >30: CPT

## 2022-12-18 RX ORDER — BUDESONIDE AND FORMOTEROL FUMARATE DIHYDRATE 160; 4.5 UG/1; UG/1
1 AEROSOL RESPIRATORY (INHALATION)
Refills: 0 | Status: DISCONTINUED | OUTPATIENT
Start: 2022-12-18 | End: 2022-12-18

## 2022-12-18 RX ORDER — ALBUTEROL 90 UG/1
2 AEROSOL, METERED ORAL
Qty: 1 | Refills: 0
Start: 2022-12-18 | End: 2023-01-16

## 2022-12-18 RX ORDER — BUDESONIDE AND FORMOTEROL FUMARATE DIHYDRATE 160; 4.5 UG/1; UG/1
1 AEROSOL RESPIRATORY (INHALATION)
Qty: 60 | Refills: 0
Start: 2022-12-18 | End: 2023-01-16

## 2022-12-18 RX ORDER — LEVOFLOXACIN 5 MG/ML
1 INJECTION, SOLUTION INTRAVENOUS
Qty: 4 | Refills: 0
Start: 2022-12-18 | End: 2022-12-21

## 2022-12-18 RX ADMIN — Medication 3 MILLILITER(S): at 08:29

## 2022-12-18 RX ADMIN — HEPARIN SODIUM 5000 UNIT(S): 5000 INJECTION INTRAVENOUS; SUBCUTANEOUS at 05:06

## 2022-12-18 RX ADMIN — Medication 75 MILLIGRAM(S): at 05:06

## 2022-12-18 RX ADMIN — Medication 3 MILLILITER(S): at 04:53

## 2022-12-18 NOTE — DISCHARGE NOTE NURSING/CASE MANAGEMENT/SOCIAL WORK - NSDCPEFALRISK_GEN_ALL_CORE
For information on Fall & Injury Prevention, visit: [email or web address] OR  [email or web address] OR  https://www.cdc.gov/steadi/patient.html

## 2022-12-18 NOTE — PROGRESS NOTE ADULT - SUBJECTIVE AND OBJECTIVE BOX
Dr. Sheri Kaur  Pager [phone]    PROGRESS NOTE:     Patient is a 72y old  Male who presents with a chief complaint of shortness of breath (17 Dec 2022 17:38)      SUBJECTIVE / OVERNIGHT EVENTS: denies chest pain or sob  ADDITIONAL REVIEW OF SYSTEMS: breathing better, afebrile     MEDICATIONS  (STANDING):  albuterol/ipratropium for Nebulization 3 milliLiter(s) Nebulizer every 6 hours  atorvastatin 20 milliGRAM(s) Oral at bedtime  azithromycin  IVPB      azithromycin  IVPB 500 milliGRAM(s) IV Intermittent every 24 hours  budesonide  80 MICROgram(s)/formoterol 4.5 MICROgram(s) Inhaler 1 Puff(s) Inhalation two times a day  cefTRIAXone   IVPB 1000 milliGRAM(s) IV Intermittent every 24 hours  heparin   Injectable 5000 Unit(s) SubCutaneous every 8 hours  oseltamivir 75 milliGRAM(s) Oral two times a day  polyethylene glycol 3350 17 Gram(s) Oral daily  tamsulosin 0.4 milliGRAM(s) Oral at bedtime    MEDICATIONS  (PRN):  acetaminophen     Tablet .. 650 milliGRAM(s) Oral every 6 hours PRN Temp greater or equal to 38C (100.4F), Mild Pain (1 - 3)  albuterol    90 MICROgram(s) HFA Inhaler 2 Puff(s) Inhalation every 6 hours PRN Bronchospasm  guaiFENesin Oral Liquid (Sugar-Free) 200 milliGRAM(s) Oral every 6 hours PRN Cough      CAPILLARY BLOOD GLUCOSE        I&O's Summary    17 Dec 2022 07:01  -  18 Dec 2022 07:00  --------------------------------------------------------  IN: 1020 mL / OUT: 1200 mL / NET: -180 mL        PHYSICAL EXAM:  Vital Signs Last 24 Hrs  T(C): 36.3 (18 Dec 2022 09:48), Max: 37.4 (18 Dec 2022 02:00)  T(F): 97.4 (18 Dec 2022 09:48), Max: 99.3 (18 Dec 2022 02:00)  HR: 115 (18 Dec 2022 09:48) (78 - 115)  BP: 144/89 (18 Dec 2022 09:48) (135/80 - 151/90)  BP(mean): --  RR: 18 (18 Dec 2022 09:48) (18 - 18)  SpO2: 97% (18 Dec 2022 09:48) (95% - 99%)    Parameters below as of 18 Dec 2022 09:48  Patient On (Oxygen Delivery Method): room air      CONSTITUTIONAL: NAD, well-developed  RESPIRATORY: Normal respiratory effort; lungs left basilar crackle   CARDIOVASCULAR: Regular rate and rhythm, normal S1 and S2, no murmur/rub/gallop; No lower extremity edema; Peripheral pulses are 2+ bilaterally  ABDOMEN: Nontender to palpation, normoactive bowel sounds, no rebound/guarding; No hepatosplenomegaly  MUSCULOSKELETAL: no clubbing or cyanosis of digits; no joint swelling or tenderness to palpation  PSYCH: A+O to person, place, and time; affect appropriate    LABS:                        12.7   14.46 )-----------( 239      ( 18 Dec 2022 07:24 )             35.0     12-18    139  |  103  |  13  ----------------------------<  105<H>  3.5   |  24  |  0.99    Ca    9.0      18 Dec 2022 07:24  Phos  4.1     12-18  Mg     1.90     12-18                Culture - Urine (collected 15 Dec 2022 14:04)  Source: Clean Catch Clean Catch (Midstream)  Final Report (16 Dec 2022 23:50):    No growth    Culture - Blood (collected 15 Dec 2022 13:05)  Source: .Blood Blood-Peripheral  Preliminary Report (16 Dec 2022 17:02):    No growth to date.    Culture - Blood (collected 15 Dec 2022 13:00)  Source: .Blood Blood-Peripheral  Preliminary Report (16 Dec 2022 17:02):    No growth to date.        RADIOLOGY & ADDITIONAL TESTS:  Results Reviewed:   Imaging Personally Reviewed:  Electrocardiogram Personally Reviewed:    COORDINATION OF CARE:  Care Discussed with Consultants/Other Providers [Y/N]:  Prior or Outpatient Records Reviewed [Y/N]:  
Dr. Sheri Kaur  Pager [phone]    PROGRESS NOTE:     Patient is a 72y old  Male who presents with a chief complaint of shortness of breath (15 Dec 2022 16:33)      SUBJECTIVE / OVERNIGHT EVENTS: denies chest pain , breathing better   ADDITIONAL REVIEW OF SYSTEMS: afebrile     MEDICATIONS  (STANDING):  albuterol/ipratropium for Nebulization 3 milliLiter(s) Nebulizer every 6 hours  atorvastatin 20 milliGRAM(s) Oral at bedtime  azithromycin  IVPB      azithromycin  IVPB 500 milliGRAM(s) IV Intermittent once  cefTRIAXone   IVPB 1000 milliGRAM(s) IV Intermittent every 24 hours  heparin   Injectable 5000 Unit(s) SubCutaneous every 8 hours  oseltamivir 75 milliGRAM(s) Oral two times a day  predniSONE   Tablet 50 milliGRAM(s) Oral daily  tamsulosin 0.4 milliGRAM(s) Oral at bedtime    MEDICATIONS  (PRN):  acetaminophen     Tablet .. 650 milliGRAM(s) Oral every 6 hours PRN Temp greater or equal to 38C (100.4F), Mild Pain (1 - 3)  albuterol    90 MICROgram(s) HFA Inhaler 2 Puff(s) Inhalation every 6 hours PRN Bronchospasm  guaiFENesin Oral Liquid (Sugar-Free) 200 milliGRAM(s) Oral every 6 hours PRN Cough      CAPILLARY BLOOD GLUCOSE      POCT Blood Glucose.: 144 mg/dL (15 Dec 2022 12:49)    I&O's Summary    15 Dec 2022 07:  -  16 Dec 2022 07:00  --------------------------------------------------------  IN: 480 mL / OUT: 200 mL / NET: 280 mL    16 Dec 2022 07:01  -  16 Dec 2022 12:43  --------------------------------------------------------  IN: 240 mL / OUT: 300 mL / NET: -60 mL        PHYSICAL EXAM:  Vital Signs Last 24 Hrs  T(C): 37.4 (16 Dec 2022 10:10), Max: 38 (15 Dec 2022 22:00)  T(F): 99.3 (16 Dec 2022 10:10), Max: 100.4 (15 Dec 2022 22:00)  HR: 86 (16 Dec 2022 10:10) (82 - 110)  BP: 126/71 (16 Dec 2022 10:10) (104/80 - 176/81)  BP(mean): --  RR: 18 (16 Dec 2022 10:10) (12 - 24)  SpO2: 98% (16 Dec 2022 10:10) (98% - 100%)    Parameters below as of 16 Dec 2022 10:10  Patient On (Oxygen Delivery Method): room air      CONSTITUTIONAL: NAD, well-developed  RESPIRATORY: Normal respiratory effort; lungs left basilar crackle   CARDIOVASCULAR: Regular rate and rhythm, normal S1 and S2, no murmur/rub/gallop; No lower extremity edema; Peripheral pulses are 2+ bilaterally  ABDOMEN: Nontender to palpation, normoactive bowel sounds, no rebound/guarding; No hepatosplenomegaly  MUSCULOSKELETAL: no clubbing or cyanosis of digits; no joint swelling or tenderness to palpation  PSYCH: A+O to person, place, and time; affect appropriate    LABS:                        12.9   18.32 )-----------( 232      ( 16 Dec 2022 06:30 )             36.1     12-16    137  |  102  |  12  ----------------------------<  115<H>  4.3   |  23  |  1.00    Ca    8.7      16 Dec 2022 06:30  Phos  2.8     12-16  Mg     2.30     12-16    TPro  7.6  /  Alb  4.2  /  TBili  1.2  /  DBili  x   /  AST  16  /  ALT  17  /  AlkPhos  78  12-15    PT/INR - ( 15 Dec 2022 13:00 )   PT: 14.4 sec;   INR: 1.24 ratio         PTT - ( 15 Dec 2022 13:00 )  PTT:27.9 sec      Urinalysis Basic - ( 15 Dec 2022 14:04 )    Color: Yellow / Appearance: Clear / S.018 / pH: x  Gluc: x / Ketone: Negative  / Bili: Negative / Urobili: <2 mg/dL   Blood: x / Protein: Negative / Nitrite: Negative   Leuk Esterase: Negative / RBC: x / WBC x   Sq Epi: x / Non Sq Epi: x / Bacteria: x          RADIOLOGY & ADDITIONAL TESTS:  Results Reviewed:   Imaging Personally Reviewed:  < from: CT Chest No Cont (12.15.22 @ 18:50) >  Scattered groundglass opacities involving the right middle lobe and   lingula, infectious or inflammatory. Short-term follow-up CT recommended   for complete evaluation.        Electrocardiogram Personally Reviewed:    COORDINATION OF CARE:  Care Discussed with Consultants/Other Providers [Y/N]:  Prior or Outpatient Records Reviewed [Y/N]:  
Dr. Sheri Kaur  Pager [phone]    PROGRESS NOTE:     Patient is a 72y old  Male who presents with a chief complaint of shortness of breath (16 Dec 2022 12:43)      SUBJECTIVE / OVERNIGHT EVENTS: breathing better  ADDITIONAL REVIEW OF SYSTEMS: afebrile     MEDICATIONS  (STANDING):  albuterol/ipratropium for Nebulization 3 milliLiter(s) Nebulizer every 6 hours  atorvastatin 20 milliGRAM(s) Oral at bedtime  azithromycin  IVPB      azithromycin  IVPB 500 milliGRAM(s) IV Intermittent every 24 hours  cefTRIAXone   IVPB 1000 milliGRAM(s) IV Intermittent every 24 hours  heparin   Injectable 5000 Unit(s) SubCutaneous every 8 hours  oseltamivir 75 milliGRAM(s) Oral two times a day  polyethylene glycol 3350 17 Gram(s) Oral daily  tamsulosin 0.4 milliGRAM(s) Oral at bedtime    MEDICATIONS  (PRN):  acetaminophen     Tablet .. 650 milliGRAM(s) Oral every 6 hours PRN Temp greater or equal to 38C (100.4F), Mild Pain (1 - 3)  albuterol    90 MICROgram(s) HFA Inhaler 2 Puff(s) Inhalation every 6 hours PRN Bronchospasm  guaiFENesin Oral Liquid (Sugar-Free) 200 milliGRAM(s) Oral every 6 hours PRN Cough      CAPILLARY BLOOD GLUCOSE        I&O's Summary    16 Dec 2022 07:01  -  17 Dec 2022 07:00  --------------------------------------------------------  IN: 1840 mL / OUT: 2200 mL / NET: -360 mL        PHYSICAL EXAM:  Vital Signs Last 24 Hrs  T(C): 36.9 (17 Dec 2022 09:38), Max: 37.7 (17 Dec 2022 01:27)  T(F): 98.4 (17 Dec 2022 09:38), Max: 99.8 (17 Dec 2022 01:27)  HR: 108 (17 Dec 2022 09:38) (71 - 108)  BP: 135/84 (17 Dec 2022 09:38) (130/82 - 147/86)  BP(mean): --  RR: 18 (17 Dec 2022 09:38) (17 - 18)  SpO2: 96% (17 Dec 2022 09:38) (96% - 100%)    Parameters below as of 17 Dec 2022 09:38  Patient On (Oxygen Delivery Method): room air      CONSTITUTIONAL: NAD, well-developed  RESPIRATORY: Normal respiratory effort; lungs left basilar crackle   CARDIOVASCULAR: Regular rate and rhythm, normal S1 and S2, no murmur/rub/gallop; No lower extremity edema; Peripheral pulses are 2+ bilaterally  ABDOMEN: Nontender to palpation, normoactive bowel sounds, no rebound/guarding; No hepatosplenomegaly  MUSCULOSKELETAL: no clubbing or cyanosis of digits; no joint swelling or tenderness to palpation  PSYCH: A+O to person, place, and time; affect appropriate      LABS:                        12.7   16.34 )-----------( 229      ( 17 Dec 2022 06:42 )             35.7     12-17    140  |  104  |  16  ----------------------------<  99  4.3   |  24  |  1.10    Ca    9.0      17 Dec 2022 06:42  Phos  3.4     12-17  Mg     2.40     12-17    TPro  7.6  /  Alb  4.2  /  TBili  1.2  /  DBili  x   /  AST  16  /  ALT  17  /  AlkPhos  78  12-15    PT/INR - ( 15 Dec 2022 13:00 )   PT: 14.4 sec;   INR: 1.24 ratio         PTT - ( 15 Dec 2022 13:00 )  PTT:27.9 sec      Urinalysis Basic - ( 15 Dec 2022 14:04 )    Color: Yellow / Appearance: Clear / S.018 / pH: x  Gluc: x / Ketone: Negative  / Bili: Negative / Urobili: <2 mg/dL   Blood: x / Protein: Negative / Nitrite: Negative   Leuk Esterase: Negative / RBC: x / WBC x   Sq Epi: x / Non Sq Epi: x / Bacteria: x        Culture - Urine (collected 15 Dec 2022 14:04)  Source: Clean Catch Clean Catch (Midstream)  Final Report (16 Dec 2022 23:50):    No growth    Culture - Blood (collected 15 Dec 2022 13:05)  Source: .Blood Blood-Peripheral  Preliminary Report (16 Dec 2022 17:02):    No growth to date.    Culture - Blood (collected 15 Dec 2022 13:00)  Source: .Blood Blood-Peripheral  Preliminary Report (16 Dec 2022 17:02):    No growth to date.        RADIOLOGY & ADDITIONAL TESTS:  Results Reviewed:   Imaging Personally Reviewed:  Electrocardiogram Personally Reviewed:    COORDINATION OF CARE:  Care Discussed with Consultants/Other Providers [Y/N]:  Prior or Outpatient Records Reviewed [Y/N]:

## 2022-12-18 NOTE — PROGRESS NOTE ADULT - PROBLEM SELECTOR PLAN 1
Hx of Asthma, only uses inhaler during exacerbation's in setting of influenza infection  - c/w tamiflu, duoneb q6, dc prednisone  - CT chest Scattered groundglass opacities involving the right middle lobe and   lingula, infectious or inflammatory.   -c/w ceftriaxone/zithromax (day 3) urine legionella and strep neg  - dc'd steroid   -Monitor O2 saturation, maintain > 95%   -Robitussin PRN for cough   --DC home today on levaquin 750mg qd x4 more days to complete 7 day course, tamiflu, albuterol and symbicort inhaler   Time spent on discharge 31 minutes coordinating discharge plan and discussing with patient and family.
Hx of Asthma, only uses inhaler during exacerbation's in setting of influenza infection  - c/w tamiflu, duoneb q6, prednisone 40mg qd x4 days  - CT chest Scattered groundglass opacities involving the right middle lobe and   lingula, infectious or inflammatory.   -add ceftriaxone/zithromax, check urine legionella/strep  -Monitor O2 saturation, maintain > 95%   -Robitussin PRN for cough   -Given 2 exacerbations in 1 year, Should be discharged on daily ICS/RENEE   -Pt likely needs inhaler use education prior to discharge  -Will benefit from pulmonary outpatient   -Monitor FS while on prednisone, as per patient no hx of DM, follow up with a1c 5.4%
Hx of Asthma, only uses inhaler during exacerbation's in setting of influenza infection  - c/w tamiflu, duoneb q6, dc prednisone  - CT chest Scattered groundglass opacities involving the right middle lobe and   lingula, infectious or inflammatory.   -add ceftriaxone/zithromax, check urine legionella, neg urine strep  - steriods likely contributing to leukocytosis, pt is nontoxic  -Monitor O2 saturation, maintain > 95%   -Robitussin PRN for cough   --DC home tomorrow on levaquin 750mg qd to complete 5 day course

## 2022-12-18 NOTE — PROGRESS NOTE ADULT - PROBLEM SELECTOR PLAN 3
continue with home atorvastatin   a1c and lipid panel reviewed

## 2022-12-18 NOTE — DISCHARGE NOTE NURSING/CASE MANAGEMENT/SOCIAL WORK - PATIENT PORTAL LINK FT
You can access the FollowMyHealth Patient Portal offered by [facility] by registering at the following website: [email or web address]. By joining [facility]’s FollowMyHealth portal, you will also be able to view your health information using other applications (apps) compatible with our system.

## 2022-12-18 NOTE — PROGRESS NOTE ADULT - PROBLEM SELECTOR PLAN 2
Influenza A +   symptoms started more than 48hrs ago, however given older age and hx of asthma -Tamiflu x 5 days   supportive care   Pt given Rocephin and azithromycin in Ed, cont for 5 day course, can change to levaquin 750 qd on DC  CT chest as above, procal 0.76
Influenza A +   symptoms started more than 48hrs ago, however given older age and hx of asthma -Tamiflu x 5 days   supportive care   dc on tamiflu  CT chest as above, procal 0.76
Influenza A +   symptoms started more than 48hrs ago, however given older age and hx of asthma -Tamiflu x 5 days   supportive care   Pt given Rocephin and azithromycin in Ed, cont for 5 day course, can change to levaquin 750 qd on DC  CT chest as above, procal 0.76

## 2022-12-20 LAB
CULTURE RESULTS: SIGNIFICANT CHANGE UP
CULTURE RESULTS: SIGNIFICANT CHANGE UP
SPECIMEN SOURCE: SIGNIFICANT CHANGE UP
SPECIMEN SOURCE: SIGNIFICANT CHANGE UP

## 2023-05-03 NOTE — ED ADULT NURSE NOTE - NSSEPSISNEWALTERMENTAL_ED_A_ED
(85.7 kg)     Height: 6' (1.829 m)         Physical Exam  Constitutional:       General: He is not in acute distress. Appearance: He is well-developed. He is not ill-appearing or toxic-appearing. HENT:      Head: Normocephalic and atraumatic. Right Ear: Tympanic membrane, ear canal and external ear normal.      Left Ear: Tympanic membrane, ear canal and external ear normal.      Nose: Nose normal.      Mouth/Throat:      Pharynx: Uvula midline. Eyes:      Conjunctiva/sclera: Conjunctivae normal.      Pupils: Pupils are equal, round, and reactive to light. Neck:      Thyroid: No thyromegaly. Vascular: No JVD. Cardiovascular:      Rate and Rhythm: Normal rate and regular rhythm. Chest Wall: PMI is not displaced. Heart sounds: Normal heart sounds, S1 normal and S2 normal. No murmur heard. Pulmonary:      Effort: Pulmonary effort is normal. No tachypnea, accessory muscle usage or respiratory distress. Breath sounds: Normal breath sounds. No decreased breath sounds, wheezing, rhonchi or rales. Abdominal:      General: Bowel sounds are normal. There is no distension. Palpations: Abdomen is soft. There is no hepatomegaly, splenomegaly or mass. Tenderness: There is no abdominal tenderness. Musculoskeletal:      Cervical back: Normal range of motion and neck supple. Lymphadenopathy:      Cervical: No cervical adenopathy. Skin:     General: Skin is warm and dry. Findings: No rash. Neurological:      Mental Status: He is alert and oriented to person, place, and time. GCS: GCS eye subscore is 4. GCS verbal subscore is 5. GCS motor subscore is 6. Cranial Nerves: No cranial nerve deficit. Sensory: No sensory deficit. Coordination: Coordination normal.      Gait: Gait normal.   Psychiatric:         Speech: Speech normal.         Behavior: Behavior normal. Behavior is cooperative.      Assessment & Plan   Shelbi Jones was seen today for 3 month follow-up
No

## 2023-10-23 NOTE — PATIENT PROFILE ADULT - NSPROGENSOURCEINFO_GEN_A_NUR
Lab Results   Component Value Date    HGBA1C 9.8 (H) 10/19/2023       Recent Labs     10/24/23  0710 10/24/23  1117 10/24/23  1605 10/24/23  2040   POCGLU 282* 366* 307* 318*       Blood Sugar Average: Last 72 hrs:  (P) 293.2    Chronic, home medications include Glargine 50 U daily, humalog 15U with mealtime, metformin 1000 mg BID.     BG still elevated likely due to steroids, will decrease solumedrol 40mg to Q12   Continue to monitor BG throughout day, if still elevated will consider increasing PM insulin dose   Continue home regimen Lantus 55U AM and 55U PM   Continue Lispro 15U with meals  ISS, algorithm 3   Hypoglycemia protocol patient

## 2025-03-17 NOTE — PATIENT PROFILE ADULT - NSPROGENOTHERPROVIDER_GEN_A_NUR
Patient last visit weight: 172  Patient current visit weight: 172.4    If you are taking phentermine or other oral weight loss medications, are you experiencing any of the following symptoms:  Headache: no  Blurred Vision: no  Chest Pain: no  Palpitations: no  Insomnia: no  SPECIFY ORAL MEDICATION AND DOSAGE:     If you are taking an injectable medication,  are you experiencing any of the following symptoms:  Bloating:   Nausea:  Vomiting:   Constipation:   Diarrhea:  SPECIFY INJECTABLE MEDICATION AND CURRENT DOSAGE:  Vitals:    Is BP less than 100/60?  Is BP greater than 140/90?  Is HR greater than 100?  **If yes to any of the above, have patient relax and repeat in 5-10 minutes**    Repeat values:    Is BP less than 100/60?  Is BP greater than 140/90?  Is HR greater than 100?  **If values remain outside of ranges above, please consult provider for next steps**      Date of last injection:    How many injections do you have left:   
none

## 2025-04-24 NOTE — H&P ADULT - NSVTERISKREFERASSESS_GEN_ALL_CORE
Refer to the Assessment tab to view/cancel completed assessment. Unable le to assess/no history of blood product transfusion